# Patient Record
Sex: FEMALE | Race: WHITE
[De-identification: names, ages, dates, MRNs, and addresses within clinical notes are randomized per-mention and may not be internally consistent; named-entity substitution may affect disease eponyms.]

---

## 2019-03-11 ENCOUNTER — HOSPITAL ENCOUNTER (EMERGENCY)
Dept: HOSPITAL 47 - EC | Age: 34
Discharge: HOME | End: 2019-03-11
Payer: COMMERCIAL

## 2019-03-11 VITALS
TEMPERATURE: 98.6 F | SYSTOLIC BLOOD PRESSURE: 116 MMHG | RESPIRATION RATE: 19 BRPM | DIASTOLIC BLOOD PRESSURE: 71 MMHG | HEART RATE: 95 BPM

## 2019-03-11 DIAGNOSIS — S70.02XA: Primary | ICD-10-CM

## 2019-03-11 DIAGNOSIS — Z88.2: ICD-10-CM

## 2019-03-11 DIAGNOSIS — Z88.1: ICD-10-CM

## 2019-03-11 DIAGNOSIS — F32.9: ICD-10-CM

## 2019-03-11 DIAGNOSIS — E11.319: ICD-10-CM

## 2019-03-11 DIAGNOSIS — Z88.5: ICD-10-CM

## 2019-03-11 DIAGNOSIS — F17.200: ICD-10-CM

## 2019-03-11 DIAGNOSIS — Z79.4: ICD-10-CM
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
## 2019-03-11 DIAGNOSIS — F41.9: ICD-10-CM

## 2019-03-11 DIAGNOSIS — S20.229A: ICD-10-CM

## 2019-03-11 DIAGNOSIS — E11.40: ICD-10-CM

## 2019-03-11 PROCEDURE — 72110 X-RAY EXAM L-2 SPINE 4/>VWS: CPT

## 2019-03-11 PROCEDURE — 71045 X-RAY EXAM CHEST 1 VIEW: CPT

## 2019-03-11 PROCEDURE — 73502 X-RAY EXAM HIP UNI 2-3 VIEWS: CPT

## 2019-03-11 PROCEDURE — 99283 EMERGENCY DEPT VISIT LOW MDM: CPT

## 2019-03-11 NOTE — ED
Fall HPI





- General


Chief Complaint: Fall


Stated Complaint: Fall


Time Seen by Provider: 19 20:09


Source: patient, EMS


Mode of arrival: EMS


Limitations: no limitations





- History of Present Illness


Initial Comments: 





This is a 33-year-old female the ER for evaluation presents today for evaluation

regarding fall.  Patient a fall from extended height, greater than 10 feet.  No 

drugs or alcohol involved.  Patient is just complaining of hip and back pain.  

Did not land on her head, she did cut herself with her arms and then onto the 

right hip.  She also has back pain.  No loss of bowel or bladder no neurological

complaint, patient is amateur


MD Complaint: fall


-: minutes(s)


Fall From: from height (distance)


When Fall Occurred: 1 hour PTA


Fall Witnessed: yes, by family


Place Fall Occurred: home


Loss of Consciousness: none


Prolonged Down Time?: no


Symptoms Prior to Fall: none


Location: pelvis, buttocks


Location - Extremities: Right: Thigh


Severity: moderate


Severity scale (1-10): 6


Quality: aching


Context: tripped/slipped


Associated Symptoms: denies





- Related Data


                                Home Medications











 Medication  Instructions  Recorded  Confirmed


 


Insulin Glulisine [Apidra] See Protocol INTRATHECA CONTINUOUS 08/02/15 03/11/19


 


ALPRAZolam 0.5 mg PO DAILY PRN 16


 


Cephalexin [Keflex] 500 mg PO TID 19








                                  Previous Rx's











 Medication  Instructions  Recorded


 


Naproxen [Naprosyn] 500 mg PO Q12HR PRN #30 tab 19











                                    Allergies











Allergy/AdvReac Type Severity Reaction Status Date / Time


 


azithromycin [From Zithromax] Allergy  Unknown Verified 19 21:24


 


morphine Allergy  Unknown Verified 19 21:24


 


Sulfa (Sulfonamide Allergy  Unknown Verified 19 21:24





Antibiotics)     














Review of Systems


ROS Statement: 


Those systems with pertinent positive or pertinent negative responses have been 

documented in the HPI.





ROS Other: All systems not noted in ROS Statement are negative.





Past Medical History


Past Medical History: Diabetes Mellitus


Additional Past Medical History / Comment(s): neuropathy, retinopathy


History of Any Multi-Drug Resistant Organisms: None Reported


Past Surgical History:  Section


Past Psychological History: Anxiety, Depression


Smoking Status: Current every day smoker


Past Alcohol Use History: None Reported


Past Drug Use History: None Reported





General Exam


Limitations: physical limitation


General appearance: alert, in no apparent distress


Head exam: Present: atraumatic, normocephalic, normal inspection


Eye exam: Present: normal appearance, PERRL, EOMI.  Absent: scleral icterus, 

conjunctival injection, periorbital swelling


ENT exam: Present: normal exam, mucous membranes moist


Neck exam: Present: normal inspection.  Absent: tenderness, meningismus, 

lymphadenopathy


Respiratory exam: Present: normal lung sounds bilaterally.  Absent: respiratory 

distress, wheezes, rales, rhonchi, stridor


Cardiovascular Exam: Present: regular rate, normal rhythm, normal heart sounds. 

Absent: systolic murmur, diastolic murmur, rubs, gallop, clicks


GI/Abdominal exam: Present: soft, normal bowel sounds.  Absent: distended, 

tenderness, guarding, rebound, rigid


Extremities exam: Present: normal inspection, full ROM, normal capillary refill.

 Absent: tenderness, pedal edema, joint swelling, calf tenderness


Back exam: Present: normal inspection


Neurological exam: Present: alert, oriented X3, CN II-XII intact


Psychiatric exam: Present: normal affect, normal mood


Skin exam: Present: warm, dry, intact, normal color.  Absent: rash





Course


                                   Vital Signs











  19





  20:08


 


Temperature 98.6 F


 


Pulse Rate 95


 


Respiratory 19





Rate 


 


Blood Pressure 116/71


 


O2 Sat by Pulse 97





Oximetry 














- Reevaluation(s)


Reevaluation #1: 





Medical record is reviewed





Patient is able to ambulate








Medical Decision Making





- Medical Decision Making





33 female the ER for evaluation status post fall.  Patient is currently 

ambulatory.  Pain is controlled, x-rays are negative.  No bowel pain no again 

loss of consciousness.  No drugs or alcohol.  Patient can be discharged home





- Radiology Data


Radiology results: report reviewed (Chest x-ray pelvis x-ray lumbosacral spine 

as well as right hip are negative for traumatic injury), image reviewed





Disposition


Clinical Impression: 


 Fall, Back contusion, Contusion of right hip





Disposition: HOME SELF-CARE


Condition: Good


Instructions (If sedation given, give patient instructions):  Contusion in 

Adults (ED), Back Pain (ED), Hip Pain (ED)


Prescriptions: 


Naproxen [Naprosyn] 500 mg PO Q12HR PRN #30 tab


 PRN Reason: Pain


Is patient prescribed a controlled substance at d/c from ED?: No


Referrals: 


Tashia Kothari MD [Primary Care Provider] - 1-2 days

## 2019-03-11 NOTE — XR
EXAMINATION TYPE: XR chest 1V

 

DATE OF EXAM: 3/11/2019

 

COMPARISON: Chest x-ray April 2, 2016

 

HISTORY: Chest pain after fall injury.

 

TECHNIQUE: Single frontal view of the chest is obtained.

 

FINDINGS:  There is no focal air space opacity, pleural effusion, or pneumothorax seen.  The cardiac 
silhouette size is within normal limits.   The osseous structures are intact.

 

IMPRESSION:  No acute cardiopulmonary process.

## 2019-03-11 NOTE — XR
EXAMINATION TYPE: XR lumbosacral spine min 4V

 

DATE OF EXAM: 3/11/2019

 

CLINICAL HISTORY: Back pain after fall injury.

 

TECHNIQUE: Frontal, lateral, and oblique images of the lumbar spine are obtained.

 

COMPARISON: None

 

FINDINGS:  There are 5 lumbar type vertebral bodies identified.  The lumbar spine shows satisfactory 
alignment without evidence of acute fracture or dislocation. Vertebral body heights and disk space he
ights are within normal limits. One oblique image is suboptimal in technique. The other is within nor
mal limits. The overlying soft tissue appears unremarkable.

 

IMPRESSION:  No acute fracture or dislocation is seen in the lumbar spine.

## 2019-03-11 NOTE — XR
EXAMINATION TYPE: XR Hip LT and AP Pelvis

 

DATE OF EXAM: 3/11/2019

 

COMPARISON: NONE

 

HISTORY: Pelvic and left hip pain after fall injury.

 

TECHNIQUE: A single AP view of the pelvis is obtained. Two views of the left hip are obtained.  

 

FINDINGS:  There is no acute fracture/dislocation evident in the pelvis.  The hip and sacroiliac join
ts appear symmetric and unremarkable. Tubal ligation clips overlie the pelvis.

 

Two views of left hip show no acute fracture or dislocation.  No focal lytic or sclerotic lesion seen
 in the proximal left femur.  The overlying soft tissue is unremarkable.  

 

IMPRESSION:  There is no acute fracture or dislocation in the pelvis or left hip.

## 2019-09-06 ENCOUNTER — HOSPITAL ENCOUNTER (OUTPATIENT)
Dept: HOSPITAL 47 - LABWHC1 | Age: 34
Discharge: HOME | End: 2019-09-06
Attending: INTERNAL MEDICINE
Payer: COMMERCIAL

## 2019-09-06 DIAGNOSIS — E11.9: Primary | ICD-10-CM

## 2019-09-06 LAB
ANION GAP SERPL CALC-SCNC: 8 MMOL/L (ref 4–12)
BUN SERPL-SCNC: 9 MG/DL (ref 9–27)
BUN/CREAT SERPL: 10 RATIO (ref 12–20)
CALCIUM SPEC-MCNC: 10.1 MG/DL (ref 8.7–10.3)
CHLORIDE SERPL-SCNC: 101 MMOL/L (ref 96–109)
CO2 SERPL-SCNC: 26 MMOL/L (ref 21.6–31.8)
GLUCOSE SERPL-MCNC: 346 MG/DL (ref 70–110)
HBA1C MFR BLD: 7.8 % (ref 4–6)
POTASSIUM SERPL-SCNC: 5.9 MMOL/L (ref 3.5–5.5)
SODIUM SERPL-SCNC: 135 MMOL/L (ref 135–145)

## 2019-09-06 PROCEDURE — 83036 HEMOGLOBIN GLYCOSYLATED A1C: CPT

## 2019-09-06 PROCEDURE — 80048 BASIC METABOLIC PNL TOTAL CA: CPT

## 2019-09-06 PROCEDURE — 36415 COLL VENOUS BLD VENIPUNCTURE: CPT

## 2021-11-01 ENCOUNTER — HOSPITAL ENCOUNTER (OUTPATIENT)
Dept: HOSPITAL 47 - LABWHC1 | Age: 36
Discharge: HOME | End: 2021-11-01
Attending: INTERNAL MEDICINE
Payer: COMMERCIAL

## 2021-11-01 DIAGNOSIS — E11.9: Primary | ICD-10-CM

## 2021-11-01 LAB
ANION GAP SERPL CALC-SCNC: 16.3 MMOL/L (ref 4–12)
BUN SERPL-SCNC: 10.5 MG/DL (ref 9–27)
BUN/CREAT SERPL: 10.4 RATIO (ref 12–20)
CALCIUM SPEC-MCNC: 9.2 MG/DL (ref 8.7–10.3)
CHLORIDE SERPL-SCNC: 95 MMOL/L (ref 96–109)
CO2 SERPL-SCNC: 21.9 MMOL/L (ref 21.6–31.8)
GLUCOSE SERPL-MCNC: 240 MG/DL (ref 70–110)
POTASSIUM SERPL-SCNC: 3.9 MMOL/L (ref 3.5–5.5)
SODIUM SERPL-SCNC: 133 MMOL/L (ref 135–145)

## 2021-11-01 PROCEDURE — 36415 COLL VENOUS BLD VENIPUNCTURE: CPT

## 2021-11-01 PROCEDURE — 80048 BASIC METABOLIC PNL TOTAL CA: CPT

## 2021-11-01 PROCEDURE — 83036 HEMOGLOBIN GLYCOSYLATED A1C: CPT

## 2021-11-03 ENCOUNTER — HOSPITAL ENCOUNTER (OUTPATIENT)
Dept: HOSPITAL 47 - RADUSWWP | Age: 36
Discharge: HOME | End: 2021-11-03
Attending: INTERNAL MEDICINE
Payer: COMMERCIAL

## 2021-11-03 DIAGNOSIS — R10.9: Primary | ICD-10-CM

## 2021-11-03 PROCEDURE — 76770 US EXAM ABDO BACK WALL COMP: CPT

## 2021-11-03 NOTE — US
EXAMINATION TYPE: US kidneys/renal and bladder

 

DATE OF EXAM: 11/3/2021

 

COMPARISON: NONE

 

CLINICAL HISTORY: 36-year-old female N12 Tubulo- interstitial nephritis. Flank pain. 

 

TECHNIQUE: Multiple sonographic images of the kidneys and bladder are obtained.

 

FINDINGS:

 

EXAM MEASUREMENTS:

 

Right Kidney:  12.0 x 5.6 x 5.3 cm

Left Kidney: 10.9 x 4.5 x 5.8 cm

 

 

No hydronephrosis on either side.

 

Bladder: distended, anechoic

**Bilateral Jets seen

 

 

 

 

 

IMPRESSION:

No hydronephrosis. No discrete sonographic abnormality of the kidneys.

## 2022-11-25 ENCOUNTER — HOSPITAL ENCOUNTER (OUTPATIENT)
Dept: HOSPITAL 47 - LABWHC1 | Age: 37
Discharge: HOME | End: 2022-11-25
Attending: INTERNAL MEDICINE
Payer: COMMERCIAL

## 2022-11-25 DIAGNOSIS — E11.9: Primary | ICD-10-CM

## 2022-11-25 LAB
ANION GAP SERPL CALC-SCNC: 10.9 MMOL/L (ref 10–18)
BUN SERPL-SCNC: 9.1 MG/DL (ref 9–27)
BUN/CREAT SERPL: 10.11 RATIO (ref 12–20)
CALCIUM SPEC-MCNC: 10 MG/DL (ref 8.7–10.3)
CHLORIDE SERPL-SCNC: 104 MMOL/L (ref 96–109)
CO2 SERPL-SCNC: 27.1 MMOL/L (ref 20–27.5)
GLUCOSE SERPL-MCNC: 71 MG/DL (ref 70–110)
POTASSIUM SERPL-SCNC: 5.4 MMOL/L (ref 3.5–5.5)
SODIUM SERPL-SCNC: 142 MMOL/L (ref 135–145)

## 2022-11-25 PROCEDURE — 82043 UR ALBUMIN QUANTITATIVE: CPT

## 2022-11-25 PROCEDURE — 80048 BASIC METABOLIC PNL TOTAL CA: CPT

## 2022-11-25 PROCEDURE — 36415 COLL VENOUS BLD VENIPUNCTURE: CPT

## 2022-11-25 PROCEDURE — 83036 HEMOGLOBIN GLYCOSYLATED A1C: CPT

## 2022-11-25 PROCEDURE — 82570 ASSAY OF URINE CREATININE: CPT

## 2023-08-21 ENCOUNTER — HOSPITAL ENCOUNTER (INPATIENT)
Dept: HOSPITAL 47 - EC | Age: 38
LOS: 3 days | Discharge: HOME | DRG: 813 | End: 2023-08-24
Attending: INTERNAL MEDICINE | Admitting: INTERNAL MEDICINE
Payer: COMMERCIAL

## 2023-08-21 VITALS — BODY MASS INDEX: 21.9 KG/M2

## 2023-08-21 DIAGNOSIS — T85.614A: Primary | ICD-10-CM

## 2023-08-21 DIAGNOSIS — E10.40: ICD-10-CM

## 2023-08-21 DIAGNOSIS — E87.5: ICD-10-CM

## 2023-08-21 DIAGNOSIS — E83.39: ICD-10-CM

## 2023-08-21 DIAGNOSIS — T38.3X6A: ICD-10-CM

## 2023-08-21 DIAGNOSIS — F17.210: ICD-10-CM

## 2023-08-21 DIAGNOSIS — Z79.899: ICD-10-CM

## 2023-08-21 DIAGNOSIS — Z88.2: ICD-10-CM

## 2023-08-21 DIAGNOSIS — D72.828: ICD-10-CM

## 2023-08-21 DIAGNOSIS — E10.319: ICD-10-CM

## 2023-08-21 DIAGNOSIS — E86.0: ICD-10-CM

## 2023-08-21 DIAGNOSIS — E78.5: ICD-10-CM

## 2023-08-21 DIAGNOSIS — R25.1: ICD-10-CM

## 2023-08-21 DIAGNOSIS — Z88.5: ICD-10-CM

## 2023-08-21 DIAGNOSIS — N17.9: ICD-10-CM

## 2023-08-21 DIAGNOSIS — Z88.1: ICD-10-CM

## 2023-08-21 DIAGNOSIS — Z79.4: ICD-10-CM

## 2023-08-21 DIAGNOSIS — E10.10: ICD-10-CM

## 2023-08-21 DIAGNOSIS — I10: ICD-10-CM

## 2023-08-21 LAB
ALBUMIN SERPL-MCNC: 5.2 G/DL (ref 3.5–5)
ALP SERPL-CCNC: 97 U/L (ref 38–126)
ALT SERPL-CCNC: 44 U/L (ref 4–34)
AST SERPL-CCNC: 44 U/L (ref 14–36)
BASOPHILS # BLD AUTO: 0 K/UL (ref 0–0.2)
BASOPHILS NFR BLD AUTO: 0 %
BUN SERPL-SCNC: 32 MG/DL (ref 7–17)
CALCIUM SPEC-MCNC: 10.4 MG/DL (ref 8.4–10.2)
CHLORIDE SERPL-SCNC: 100 MMOL/L (ref 98–107)
CO2 SERPL-SCNC: <5 MMOL/L (ref 22–30)
EOSINOPHIL # BLD AUTO: 0.1 K/UL (ref 0–0.7)
EOSINOPHIL NFR BLD AUTO: 0 %
ERYTHROCYTE [DISTWIDTH] IN BLOOD BY AUTOMATED COUNT: 4.44 M/UL (ref 3.8–5.4)
ERYTHROCYTE [DISTWIDTH] IN BLOOD: 12.2 % (ref 11.5–15.5)
GLUCOSE BLD-MCNC: >600 MG/DL (ref 70–110)
GLUCOSE BLD-MCNC: >600 MG/DL (ref 70–110)
GLUCOSE SERPL-MCNC: 676 MG/DL (ref 74–99)
GLUCOSE UR QL: (no result)
HCO3 BLDV-SCNC: 5 MMOL/L (ref 24–28)
HCT VFR BLD AUTO: 46.5 % (ref 34–46)
HGB BLD-MCNC: 14.6 GM/DL (ref 11.4–16)
HYALINE CASTS UR QL AUTO: 1 /LPF (ref 0–2)
KETONES UR QL STRIP.AUTO: (no result)
LYMPHOCYTES # SPEC AUTO: 1.4 K/UL (ref 1–4.8)
LYMPHOCYTES NFR SPEC AUTO: 5 %
MAGNESIUM SPEC-SCNC: 2.7 MG/DL (ref 1.6–2.3)
MCH RBC QN AUTO: 32.8 PG (ref 25–35)
MCHC RBC AUTO-ENTMCNC: 31.4 G/DL (ref 31–37)
MCV RBC AUTO: 104.7 FL (ref 80–100)
MONOCYTES # BLD AUTO: 1.1 K/UL (ref 0–1)
MONOCYTES NFR BLD AUTO: 4 %
NEUTROPHILS # BLD AUTO: 24.5 K/UL (ref 1.3–7.7)
NEUTROPHILS NFR BLD AUTO: 90 %
PCO2 BLDV: 18 MMHG (ref 37–51)
PH BLDV: 7 [PH] (ref 7.31–7.41)
PH UR: 5 [PH] (ref 5–8)
PLATELET # BLD AUTO: 308 K/UL (ref 150–450)
POTASSIUM SERPL-SCNC: 7.4 MMOL/L (ref 3.5–5.1)
PROT SERPL-MCNC: 8.5 G/DL (ref 6.3–8.2)
PROT UR QL: (no result)
SODIUM SERPL-SCNC: 140 MMOL/L (ref 137–145)
SP GR UR: 1.02 (ref 1–1.03)
SQUAMOUS UR QL AUTO: 1 /HPF (ref 0–4)
UROBILINOGEN UR QL STRIP: <2 MG/DL (ref ?–2)
WBC # BLD AUTO: 27.2 K/UL (ref 3.8–10.6)

## 2023-08-21 PROCEDURE — 81001 URINALYSIS AUTO W/SCOPE: CPT

## 2023-08-21 PROCEDURE — 84100 ASSAY OF PHOSPHORUS: CPT

## 2023-08-21 PROCEDURE — 99291 CRITICAL CARE FIRST HOUR: CPT

## 2023-08-21 PROCEDURE — 80048 BASIC METABOLIC PNL TOTAL CA: CPT

## 2023-08-21 PROCEDURE — 82947 ASSAY GLUCOSE BLOOD QUANT: CPT

## 2023-08-21 PROCEDURE — 82565 ASSAY OF CREATININE: CPT

## 2023-08-21 PROCEDURE — 82803 BLOOD GASES ANY COMBINATION: CPT

## 2023-08-21 PROCEDURE — 82009 KETONE BODYS QUAL: CPT

## 2023-08-21 PROCEDURE — 85025 COMPLETE CBC W/AUTO DIFF WBC: CPT

## 2023-08-21 PROCEDURE — 84520 ASSAY OF UREA NITROGEN: CPT

## 2023-08-21 PROCEDURE — 83036 HEMOGLOBIN GLYCOSYLATED A1C: CPT

## 2023-08-21 PROCEDURE — 83735 ASSAY OF MAGNESIUM: CPT

## 2023-08-21 PROCEDURE — 96361 HYDRATE IV INFUSION ADD-ON: CPT

## 2023-08-21 PROCEDURE — 80051 ELECTROLYTE PANEL: CPT

## 2023-08-21 PROCEDURE — 36415 COLL VENOUS BLD VENIPUNCTURE: CPT

## 2023-08-21 PROCEDURE — 94644 CONT INHLJ TX 1ST HOUR: CPT

## 2023-08-21 PROCEDURE — 84484 ASSAY OF TROPONIN QUANT: CPT

## 2023-08-21 PROCEDURE — 96374 THER/PROPH/DIAG INJ IV PUSH: CPT

## 2023-08-21 PROCEDURE — 93005 ELECTROCARDIOGRAM TRACING: CPT

## 2023-08-21 PROCEDURE — 80053 COMPREHEN METABOLIC PANEL: CPT

## 2023-08-21 RX ADMIN — INSULIN HUMAN SCH MLS/HR: 100 INJECTION, SOLUTION PARENTERAL at 23:21

## 2023-08-21 NOTE — ED
Recheck HPI





- General


Chief Complaint: Recheck/Abnormal Lab/Rx


Stated Complaint: hyperglycemia


Time Seen by Provider: 23 20:39


Source: patient, RN notes reviewed, old records reviewed


Mode of arrival: ambulatory


Limitations: altered mental status





- History of Present Illness


Initial Comments: 





This is a 38 history of diabetes.  Patient coming in with severe nausea vomiting

weakness not feeling well.  Patient is having significant to severe distress.  

Poor strain secondary to clinical condition with severe shortness of breath 

nausea vomiting and weakness.  Patient lost her insulin pump yesterday and 

without the sun all day does admit to some alcohol consumption yesterday and 

severely altered today and severely ill


MD Complaint: abnormal lab (Elevated blood sugar)


-: hour(s)


Symptoms Since Prior Visit: worsening pain


Associated Symptoms: none


Treatments Prior to Arrival: other (0)





- Related Data


                                Home Medications











 Medication  Instructions  Recorded  Confirmed


 


Atorvastatin [Lipitor] 10 mg PO DAILY 23


 


DULoxetine HCL [Cymbalta] 60 mg PO DAILY 23


 


Glucagon [Baqsimi] 1 spray NASAL ONCE PRN 23


 


INSULIN LISPRO (For Pump) [humaLOG 0.01 units SQ-PUMP CONTINUOUS 23





(For Pump)]   








                                  Previous Rx's











 Medication  Instructions  Recorded


 


Calcium Carbonate [Tums] 500 mg PO QID PRN  tab 23


 


Ondansetron Odt [Zofran Odt] 4 mg PO Q8HR PRN #30 tab 23











                                    Allergies











Allergy/AdvReac Type Severity Reaction Status Date / Time


 


azithromycin [From Zithromax] Allergy  Unknown Verified 19 21:24


 


morphine Allergy  Unknown Verified 19 21:24


 


Sulfa (Sulfonamide Allergy  Unknown Verified 19 21:24





Antibiotics)     














Review of Systems


ROS Statement: 


Those systems with pertinent positive or pertinent negative responses have been 

documented in the HPI.





ROS Other: All systems not noted in ROS Statement are negative.





Past Medical History


Past Medical History: Diabetes Mellitus


Additional Past Medical History / Comment(s): neuropathy, retinopathy


History of Any Multi-Drug Resistant Organisms: None Reported


Past Surgical History:  Section


Past Psychological History: Anxiety, Depression


Past Alcohol Use History: None Reported


Past Drug Use History: None Reported





General Exam


Limitations: altered mental status, physical limitation


General appearance: alert, in no apparent distress, anxious, lethargic, in distr

ess


Head exam: Present: atraumatic, normocephalic, normal inspection


Eye exam: Present: normal appearance, PERRL, EOMI.  Absent: scleral icterus, 

conjunctival injection, periorbital swelling


ENT exam: Present: normal exam, mucous membranes dry


Neck exam: Present: normal inspection.  Absent: tenderness, meningismus, 

lymphadenopathy


Respiratory exam: Present: normal lung sounds bilaterally.  Absent: respiratory 

distress, wheezes, rales, rhonchi, stridor


Cardiovascular Exam: Present: normal rhythm, tachycardia, normal heart sounds.  

Absent: systolic murmur, diastolic murmur, rubs, gallop, clicks


GI/Abdominal exam: Present: soft, normal bowel sounds.  Absent: distended, 

tenderness, guarding, rebound, rigid


Extremities exam: Present: normal inspection, full ROM, normal capillary refill.

 Absent: tenderness, pedal edema, joint swelling, calf tenderness


Back exam: Present: normal inspection


Neurological exam: Present: alert, oriented X3, CN II-XII intact


Psychiatric exam: Present: normal affect, normal mood


Skin exam: Present: warm, dry, intact, normal color.  Absent: rash





Course


                                   Vital Signs











  23





  20:22 22:55


 


Temperature 97.9 F 


 


Pulse Rate 118 H 121 H


 


Respiratory 18 42 H





Rate  


 


Blood Pressure 113/55 


 


O2 Sat by Pulse 100 





Oximetry  














- Reevaluation(s)


Reevaluation #1: 





23 22:42


Medical records reviewed


Reevaluation #2: 





23 22:42


Patient found be in severe DKA, improving with hydration and Ativan shaking has 

significant


Reevaluation #3: 





23 22:43


Patient informed results and questions answered


Reevaluation #4: 





23 22:43


Was pt. sent in by a medical professional or institution (, PA, NP, urgent 

care, hospital, or nursing home...) When possible be specific


@  -no


Did you speak to anyone other than the patient for history (EMS, parent, family,

police, friend...)? What history was obtained from this source 


@  -no


Did you review nursing and triage notes (agree or disagree)?  Why? 


@  -agree


Are old charts reviewed (outside hosp., previous admission, EMS record, old EKG,

old radiological studies, urgent care reports/EKG's, nursing home records)? 

Report findings 


@  -yes


Differential Diagnosis (chest pain, altered mental status, abdominal pain women,

abdominal pain men, vaginal bleeding, weakness, fever, dyspnea, syncope, 

headache, dizziness, GI bleed, back pain, seizure, CVA, palpatations, mental 

health, musculoskeletal)? 


@  -prior


EKG interpreted by me (3pts min.).


@  -yes


X-rays interpreted by me (1pt min.).


@  -no


CT interpreted by me (1pt min.).


@  -no


U/S interpreted by me (1pt. min.).


@  -no


What testing was considered but not performed or refused? (CT, X-rays, U/S, 

labs)? Why?


@  -none


What meds were considered but not given or refused? Why?


@  -none


Did you discuss the management of the patient with other professionals 

(professionals i.e. , PA, NP, lab, RT, psych nurse, , , 

teacher, , )? Give summary


@  -no


Was smoking cessation discussed for >3mins.?


@  -no


Was critical care preformed (if so, how long)?


@  -yes65


Were there social determinants of health that impacted care today? How? 

(Homelessness, low income, unemployed, alcoholism, drug addiction, 

transportation, low edu. Level, literacy, decrease access to med. care, prison, 

rehab)?


@  -none


Was there de-escalation of care discussed even if they declined (Discuss DNR or 

withdrawal of care, Hospice)? DNR status


@  -no


What co-morbidities impacted this encounter? (DM, HTN, Smoking, COPD, CAD, 

Cancer, CVA, ARF, Chemo, Hep., AIDS, mental health diagnosis, sleep apnea, 

morbid obesity)?


@  -none


Was patient admitted / discharged? Hospital course, mention meds given and 

route, prescriptions, significant lab abnormalities, going to OR and other 

pertinent info.


@  - 38 female to the emergency department presenting in severe distress, nausea

vomiting weakness shaking tremors.  Severe shortness of breath difficulty 

breathing.  Patient is found to be in significant DKA pH 7.0, patient will be 

admitted for DKA protocol to the ICU


Admitted


Undiagnosed new problem with uncertain prognosis?


@  -no


Drug Therapy requiring intensive monitoring for toxicity (Heparin, Nitro, 

Insulin, Cardizem)?


@  -no


Were any procedures done?


@  -no


Diagnosis/symptom?


@  -Diabetic ketoacidosis


Acute, or Chronic, or Acute on Chronic?


@  -Acute


Uncomplicated (without systemic symptoms) or Complicated (systemic symptoms)?


@  -Complicated


Side effects of treatment?


@  -no


Exacerbation, Progression, or Severe Exacerbation?


@  -exacerbation


Poses a threat to life or bodily function? How? (Chest pain, USA, MI, pneumonia,

PE, COPD, DKA, ARF, appy, cholecystitis, CVA, Diverticulitis, Homicidal, 

Suicidal, threat to staff... and all critical care pts)


@  -yes severe illness with DKA





Reevaluation #5: 





23 22:43


Differential Altered Mental Status:


Hypoglycemia, DKA, hypercapnia, ETOH, overdose, CO poisoning, trauma, myxedema 

coma, HTN encephalopathy, infection, encephalitis, psychosis, intercranial 

hemorrhage, hepatic encephalopathy, meningitis, CVA, this is not meant to be an 

all-inclusive list





- Consultations


Consultation #1: 





Spoke with sound physicians who agree to admit this patient


Consultation #2: 





Spoke with ICU who accepts admission to the ICU this patient





Medical Decision Making





- Medical Decision Making





38 female to the emergency department presenting in severe distress, nausea 

vomiting weakness shaking tremors.  Severe shortness of breath difficulty 

breathing.  Patient is found to be in significant DKA pH 7.0, patient will be 

admitted for DKA protocol to the ICU





- Lab Data


Result diagrams: 


                                 23 05:25





                                 23 05:25


                                   Lab Results











  23 Range/Units





  20:55 20:55 20:55 


 


WBC  27.2 H    (3.8-10.6)  k/uL


 


RBC  4.44    (3.80-5.40)  m/uL


 


Hgb  14.6    (11.4-16.0)  gm/dL


 


Hct  46.5 H    (34.0-46.0)  %


 


MCV  104.7 H    (80.0-100.0)  fL


 


MCH  32.8    (25.0-35.0)  pg


 


MCHC  31.4    (31.0-37.0)  g/dL


 


RDW  12.2    (11.5-15.5)  %


 


Plt Count  308    (150-450)  k/uL


 


MPV  9.5    


 


Neutrophils %  90    %


 


Lymphocytes %  5    %


 


Monocytes %  4    %


 


Eosinophils %  0    %


 


Basophils %  0    %


 


Neutrophils #  24.5 H    (1.3-7.7)  k/uL


 


Lymphocytes #  1.4    (1.0-4.8)  k/uL


 


Monocytes #  1.1 H    (0-1.0)  k/uL


 


Eosinophils #  0.1    (0-0.7)  k/uL


 


Basophils #  0.0    (0-0.2)  k/uL


 


Hypochromasia  Marked    


 


Macrocytosis  Slight    


 


VBG pH     (7.31-7.41)  


 


VBG pCO2     (37-51)  mmHg


 


VBG HCO3     (24-28)  mmol/L


 


Sodium    140  (137-145)  mmol/L


 


Potassium    7.4 H*  (3.5-5.1)  mmol/L


 


Chloride    100  ()  mmol/L


 


Carbon Dioxide    <5 L*  (22-30)  mmol/L


 


Anion Gap      mmol/L


 


BUN    32 H  (7-17)  mg/dL


 


Creatinine    1.49 H  (0.52-1.04)  mg/dL


 


Est GFR (CKD-EPI)AfAm    51  (>60 ml/min/1.73 sqM)  


 


Est GFR (CKD-EPI)NonAf    44  (>60 ml/min/1.73 sqM)  


 


Glucose    676 H*  (74-99)  mg/dL


 


POC Glucose (mg/dL)     ()  mg/dL


 


POC Glu Operater ID     


 


Calcium    10.4 H  (8.4-10.2)  mg/dL


 


Phosphorus    9.9 H*  (2.5-4.5)  mg/dL


 


Magnesium    2.7 H  (1.6-2.3)  mg/dL


 


Total Bilirubin    0.8  (0.2-1.3)  mg/dL


 


AST    44 H  (14-36)  U/L


 


ALT    44 H  (4-34)  U/L


 


Alkaline Phosphatase    97  ()  U/L


 


Troponin I     (0.000-0.034)  ng/mL


 


Total Protein    8.5 H  (6.3-8.2)  g/dL


 


Albumin    5.2 H  (3.5-5.0)  g/dL


 


Urine Color   Light Yellow   


 


Urine Appearance   Clear   (Clear)  


 


Urine pH   5.0   (5.0-8.0)  


 


Ur Specific Gravity   1.018   (1.001-1.035)  


 


Urine Protein   1+ H   (Negative)  


 


Urine Glucose (UA)   4+ H   (Negative)  


 


Urine Ketones   4+ H   (Negative)  


 


Urine Blood   Small H   (Negative)  


 


Urine Nitrite   Negative   (Negative)  


 


Urine Bilirubin   Negative   (Negative)  


 


Urine Urobilinogen   <2.0   (<2.0)  mg/dL


 


Ur Leukocyte Esterase   Negative   (Negative)  


 


Ur Squamous Epith Cells   1   (0-4)  /hpf


 


Hyaline Casts   1   (0-2)  /lpf


 


Urine Mucus   Rare H   (None)  /hpf


 


Acetone, Qual    Positive  (Negative)  














  23 Range/Units





  20:55 21:25 21:33 


 


WBC     (3.8-10.6)  k/uL


 


RBC     (3.80-5.40)  m/uL


 


Hgb     (11.4-16.0)  gm/dL


 


Hct     (34.0-46.0)  %


 


MCV     (80.0-100.0)  fL


 


MCH     (25.0-35.0)  pg


 


MCHC     (31.0-37.0)  g/dL


 


RDW     (11.5-15.5)  %


 


Plt Count     (150-450)  k/uL


 


MPV     


 


Neutrophils %     %


 


Lymphocytes %     %


 


Monocytes %     %


 


Eosinophils %     %


 


Basophils %     %


 


Neutrophils #     (1.3-7.7)  k/uL


 


Lymphocytes #     (1.0-4.8)  k/uL


 


Monocytes #     (0-1.0)  k/uL


 


Eosinophils #     (0-0.7)  k/uL


 


Basophils #     (0-0.2)  k/uL


 


Hypochromasia     


 


Macrocytosis     


 


VBG pH    7.00 L*  (7.31-7.41)  


 


VBG pCO2    18 L*  (37-51)  mmHg


 


VBG HCO3    5 L*  (24-28)  mmol/L


 


Sodium     (137-145)  mmol/L


 


Potassium     (3.5-5.1)  mmol/L


 


Chloride     ()  mmol/L


 


Carbon Dioxide     (22-30)  mmol/L


 


Anion Gap     mmol/L


 


BUN     (7-17)  mg/dL


 


Creatinine     (0.52-1.04)  mg/dL


 


Est GFR (CKD-EPI)AfAm     (>60 ml/min/1.73 sqM)  


 


Est GFR (CKD-EPI)NonAf     (>60 ml/min/1.73 sqM)  


 


Glucose     (74-99)  mg/dL


 


POC Glucose (mg/dL)   >600 H   ()  mg/dL


 


POC Glu Operater ID   Augusto Porter   


 


Calcium     (8.4-10.2)  mg/dL


 


Phosphorus     (2.5-4.5)  mg/dL


 


Magnesium     (1.6-2.3)  mg/dL


 


Total Bilirubin     (0.2-1.3)  mg/dL


 


AST     (14-36)  U/L


 


ALT     (4-34)  U/L


 


Alkaline Phosphatase     ()  U/L


 


Troponin I  <0.012    (0.000-0.034)  ng/mL


 


Total Protein     (6.3-8.2)  g/dL


 


Albumin     (3.5-5.0)  g/dL


 


Urine Color     


 


Urine Appearance     (Clear)  


 


Urine pH     (5.0-8.0)  


 


Ur Specific Gravity     (1.001-1.035)  


 


Urine Protein     (Negative)  


 


Urine Glucose (UA)     (Negative)  


 


Urine Ketones     (Negative)  


 


Urine Blood     (Negative)  


 


Urine Nitrite     (Negative)  


 


Urine Bilirubin     (Negative)  


 


Urine Urobilinogen     (<2.0)  mg/dL


 


Ur Leukocyte Esterase     (Negative)  


 


Ur Squamous Epith Cells     (0-4)  /hpf


 


Hyaline Casts     (0-2)  /lpf


 


Urine Mucus     (None)  /hpf


 


Acetone, Qual     (Negative)  














- EKG Data


-: EKG Interpreted by Me (EKG is sinus tachycardia 124  QRS 83 )





Critical Care Time


Critical Care Time: Yes


Total Critical Care Time: 65





Disposition


Clinical Impression: 


 Tachycardia, Hyperkalemia, DKA (diabetic ketoacidosis)





Disposition: ADMITTED AS IP TO [facility] [place]


Condition: Good


Is patient prescribed a controlled substance at d/c from ED?: No


Time of Disposition: 22:30

## 2023-08-22 LAB
ALBUMIN SERPL-MCNC: 3.8 G/DL (ref 3.5–5)
ALP SERPL-CCNC: 63 U/L (ref 38–126)
ALT SERPL-CCNC: 25 U/L (ref 4–34)
ANION GAP SERPL CALC-SCNC: 11 MMOL/L
ANION GAP SERPL CALC-SCNC: 19 MMOL/L
ANION GAP SERPL CALC-SCNC: 9 MMOL/L
AST SERPL-CCNC: 32 U/L (ref 14–36)
BASOPHILS # BLD AUTO: 0.1 K/UL (ref 0–0.2)
BASOPHILS NFR BLD AUTO: 0 %
BUN SERPL-SCNC: 32 MG/DL (ref 7–17)
BUN SERPL-SCNC: 34 MG/DL (ref 7–17)
CALCIUM SPEC-MCNC: 7.9 MG/DL (ref 8.4–10.2)
CHLORIDE SERPL-SCNC: 107 MMOL/L (ref 98–107)
CHLORIDE SERPL-SCNC: 111 MMOL/L (ref 98–107)
CHLORIDE SERPL-SCNC: 111 MMOL/L (ref 98–107)
CHLORIDE SERPL-SCNC: 112 MMOL/L (ref 98–107)
CO2 SERPL-SCNC: 14 MMOL/L (ref 22–30)
CO2 SERPL-SCNC: 19 MMOL/L (ref 22–30)
CO2 SERPL-SCNC: 9 MMOL/L (ref 22–30)
CO2 SERPL-SCNC: <5 MMOL/L (ref 22–30)
EOSINOPHIL # BLD AUTO: 0.3 K/UL (ref 0–0.7)
EOSINOPHIL NFR BLD AUTO: 1 %
ERYTHROCYTE [DISTWIDTH] IN BLOOD BY AUTOMATED COUNT: 3.99 M/UL (ref 3.8–5.4)
ERYTHROCYTE [DISTWIDTH] IN BLOOD: 12.6 % (ref 11.5–15.5)
GLUCOSE BLD-MCNC: 101 MG/DL (ref 70–110)
GLUCOSE BLD-MCNC: 152 MG/DL (ref 70–110)
GLUCOSE BLD-MCNC: 155 MG/DL (ref 70–110)
GLUCOSE BLD-MCNC: 185 MG/DL (ref 70–110)
GLUCOSE BLD-MCNC: 190 MG/DL (ref 70–110)
GLUCOSE BLD-MCNC: 207 MG/DL (ref 70–110)
GLUCOSE BLD-MCNC: 227 MG/DL (ref 70–110)
GLUCOSE BLD-MCNC: 249 MG/DL (ref 70–110)
GLUCOSE BLD-MCNC: 270 MG/DL (ref 70–110)
GLUCOSE BLD-MCNC: 277 MG/DL (ref 70–110)
GLUCOSE BLD-MCNC: 286 MG/DL (ref 70–110)
GLUCOSE BLD-MCNC: 290 MG/DL (ref 70–110)
GLUCOSE BLD-MCNC: 293 MG/DL (ref 70–110)
GLUCOSE BLD-MCNC: 321 MG/DL (ref 70–110)
GLUCOSE BLD-MCNC: 358 MG/DL (ref 70–110)
GLUCOSE BLD-MCNC: 364 MG/DL (ref 70–110)
GLUCOSE BLD-MCNC: 405 MG/DL (ref 70–110)
GLUCOSE BLD-MCNC: 434 MG/DL (ref 70–110)
GLUCOSE BLD-MCNC: 442 MG/DL (ref 70–110)
GLUCOSE BLD-MCNC: 542 MG/DL (ref 70–110)
GLUCOSE BLD-MCNC: 572 MG/DL (ref 70–110)
GLUCOSE BLD-MCNC: 63 MG/DL (ref 70–110)
GLUCOSE BLD-MCNC: 65 MG/DL (ref 70–110)
GLUCOSE BLD-MCNC: 67 MG/DL (ref 70–110)
GLUCOSE BLD-MCNC: 93 MG/DL (ref 70–110)
GLUCOSE SERPL-MCNC: 357 MG/DL (ref 74–99)
GLUCOSE SERPL-MCNC: 498 MG/DL (ref 74–99)
HCT VFR BLD AUTO: 39.2 % (ref 34–46)
HGB BLD-MCNC: 13.1 GM/DL (ref 11.4–16)
LYMPHOCYTES # SPEC AUTO: 1.7 K/UL (ref 1–4.8)
LYMPHOCYTES NFR SPEC AUTO: 5 %
MCH RBC QN AUTO: 32.7 PG (ref 25–35)
MCHC RBC AUTO-ENTMCNC: 33.3 G/DL (ref 31–37)
MCV RBC AUTO: 98.2 FL (ref 80–100)
MONOCYTES # BLD AUTO: 1.5 K/UL (ref 0–1)
MONOCYTES NFR BLD AUTO: 4 %
NEUTROPHILS # BLD AUTO: 30.6 K/UL (ref 1.3–7.7)
NEUTROPHILS NFR BLD AUTO: 89 %
PLATELET # BLD AUTO: 230 K/UL (ref 150–450)
POTASSIUM SERPL-SCNC: 3.3 MMOL/L (ref 3.5–5.1)
POTASSIUM SERPL-SCNC: 3.9 MMOL/L (ref 3.5–5.1)
POTASSIUM SERPL-SCNC: 4.3 MMOL/L (ref 3.5–5.1)
POTASSIUM SERPL-SCNC: 4.4 MMOL/L (ref 3.5–5.1)
PROT SERPL-MCNC: 6.3 G/DL (ref 6.3–8.2)
SODIUM SERPL-SCNC: 137 MMOL/L (ref 137–145)
SODIUM SERPL-SCNC: 139 MMOL/L (ref 137–145)
SODIUM SERPL-SCNC: 139 MMOL/L (ref 137–145)
SODIUM SERPL-SCNC: 142 MMOL/L (ref 137–145)
WBC # BLD AUTO: 34.3 K/UL (ref 3.8–10.6)

## 2023-08-22 RX ADMIN — ATORVASTATIN CALCIUM SCH: 10 TABLET, FILM COATED ORAL at 08:30

## 2023-08-22 RX ADMIN — INSULIN HUMAN SCH MLS/HR: 100 INJECTION, SOLUTION PARENTERAL at 20:32

## 2023-08-22 RX ADMIN — POTASSIUM CHLORIDE SCH MEQ: 20 TABLET, EXTENDED RELEASE ORAL at 17:50

## 2023-08-22 RX ADMIN — ONDANSETRON PRN MG: 2 INJECTION INTRAMUSCULAR; INTRAVENOUS at 15:28

## 2023-08-22 RX ADMIN — DEXTROSE MONOHYDRATE, SODIUM CHLORIDE, AND POTASSIUM CHLORIDE SCH MLS/HR: 50; 4.5; 1.49 INJECTION, SOLUTION INTRAVENOUS at 22:04

## 2023-08-22 RX ADMIN — HEPARIN SODIUM SCH: 5000 INJECTION, SOLUTION INTRAVENOUS; SUBCUTANEOUS at 08:30

## 2023-08-22 RX ADMIN — POTASSIUM CHLORIDE SCH MEQ: 20 TABLET, EXTENDED RELEASE ORAL at 18:45

## 2023-08-22 RX ADMIN — INSULIN HUMAN SCH MLS/HR: 100 INJECTION, SOLUTION PARENTERAL at 09:59

## 2023-08-22 RX ADMIN — CEFAZOLIN SCH MLS/HR: 330 INJECTION, POWDER, FOR SOLUTION INTRAMUSCULAR; INTRAVENOUS at 00:51

## 2023-08-22 RX ADMIN — HEPARIN SODIUM SCH: 5000 INJECTION, SOLUTION INTRAVENOUS; SUBCUTANEOUS at 20:05

## 2023-08-22 RX ADMIN — DEXTROSE MONOHYDRATE, SODIUM CHLORIDE, AND POTASSIUM CHLORIDE SCH MLS/HR: 50; 4.5; 1.49 INJECTION, SOLUTION INTRAVENOUS at 08:23

## 2023-08-22 RX ADMIN — CEFAZOLIN SCH MLS/HR: 330 INJECTION, POWDER, FOR SOLUTION INTRAMUSCULAR; INTRAVENOUS at 04:10

## 2023-08-22 RX ADMIN — DEXTROSE MONOHYDRATE, SODIUM CHLORIDE, AND POTASSIUM CHLORIDE SCH MLS/HR: 50; 4.5; 1.49 INJECTION, SOLUTION INTRAVENOUS at 15:32

## 2023-08-22 NOTE — P.PN
Subjective


Progress Note Date: 08/22/23





Patient has no new complaints today.  Nausea, vomiting have improved.  Denies 

pain.





Gen: awake, alert


HEENT: normocephalic, atraumatic, good hearing acuity, moist mucous membranes


Resp: good air exchange, breathing comfortably with no accessory muscle use


CVS: good distal perfusion x 4,


GI: soft, NTTP, ND


: no SPT, no CVAT, toledo catheter not present


MSK: no pitting edema, no clubbing


Neuro: non-focal, moving all extremities


Psych: cooperative, euthymic mood





Hospital course:





38-year-old female with insulin-dependent diabetes, hypertension, hyperlipidemia

presented for evaluation of nausea, vomiting.  In the emergency room, patient 

was afebrile, 113/55, heart rate 118, 100% on room air.  CBC demonstrated 

leukocytosis at 34.3.  Basic metabolic panel showed an anion gap of 19, CO2 of 

9, BUNs 32, creatinine 1.21, glucose of 358.  Potassium was initially 7.4, but 

improved to 4.3.  Phosphorus was 2.2.  Liver function tests are unremarkable.  

EKG demonstrated sinus tachycardia with peaked T waves.  Case is discussed with 

emergency room provider incision was made with the patient's intensive care unit

for DKA.








Assessment:





Diabetic ketoacidosis


Hypertension


Hyperlipidemia








Plan:





Today, patient is afebrile, 115/63, heart rate 105, 95% on room air.  See the 

hospital course above from my review of lab work as well as independent 

interpretation of EKG.


Continue checking electrolytes every 4 hours


Order potassium phosphate for replacement of phosphorus





Continue insulin drip, monitor glucose every hour for toxicity


Resume patient's home atorvastatin


Continue IV fluids: D5 half-normal with 20 mEq of KCl running at 150 mL per hour





Patient is full code











Objective





- Vital Signs


Vital signs: 


                                   Vital Signs











Temp  99.0 F   08/22/23 08:00


 


Pulse  107 H  08/22/23 08:30


 


Resp  19   08/22/23 08:30


 


BP  95/51   08/22/23 08:30


 


Pulse Ox  100   08/22/23 08:30


 


FiO2      








                                 Intake & Output











 08/21/23 08/22/23 08/22/23





 18:59 06:59 18:59


 


Intake Total  3275.901 530.999


 


Output Total  0 0


 


Balance  3275.901 530.999


 


Weight  58 kg 


 


Intake:   


 


  Intake, IV Titration  3275.901 380.999





  Amount   


 


    D5-0.45% NaCl with KCl   150





    20Meq/l 1,000 ml @ 150   





    mls/hr IV .[id number] [facility] Rx#   





    :[id number]   


 


    Insulin Regular 100 unit  75.901 30.999





    In Sodium Chloride 0.9%   





    100 ml @ 0.1 UNITS/KG/HR   





    5.956 mls/hr IV .[id number]   





    [facility] Rx#:[id number]   


 


    Sodium Chloride 0.9% 1,  1200 200





    000 ml @ 200 mls/hr IV .   





    Q5H [facility] Rx#:[id number]   


 


    Sodium Chloride 0.9% 1,  1000 





    000 ml @ 999 mls/hr IV .   





    Q1H1M [facility] Rx#:[id number]   


 


    Sodium Chloride 0.9% 1,  1000 





    000 ml @ 999 mls/hr IV .   





    Q1H1M [facility] Rx#:[id number]   


 


  Oral   0


 


  Tube Feeding   150


 


Output:   


 


  Urine  0 0














- Labs


CBC & Chem 7: 


                                 08/22/23 06:03





                                 08/22/23 06:03


Labs: 


                  Abnormal Lab Results - Last 24 Hours (Table)











  08/21/23 08/21/23 08/21/23 Range/Units





  20:55 20:55 20:55 


 


WBC  27.2 H    (3.8-10.6)  k/uL


 


Hct  46.5 H    (34.0-46.0)  %


 


MCV  104.7 H    (80.0-100.0)  fL


 


Neutrophils #  24.5 H    (1.3-7.7)  k/uL


 


Monocytes #  1.1 H    (0-1.0)  k/uL


 


VBG pH     (7.31-7.41)  


 


VBG pCO2     (37-51)  mmHg


 


VBG HCO3     (24-28)  mmol/L


 


Potassium    7.4 H*  (3.5-5.1)  mmol/L


 


Chloride     ()  mmol/L


 


Carbon Dioxide    <5 L*  (22-30)  mmol/L


 


BUN    32 H  (7-17)  mg/dL


 


Creatinine    1.49 H  (0.52-1.04)  mg/dL


 


Glucose    676 H*  (74-99)  mg/dL


 


POC Glucose (mg/dL)     ()  mg/dL


 


Calcium    10.4 H  (8.4-10.2)  mg/dL


 


Phosphorus    9.9 H*  (2.5-4.5)  mg/dL


 


Magnesium    2.7 H  (1.6-2.3)  mg/dL


 


AST    44 H  (14-36)  U/L


 


ALT    44 H  (4-34)  U/L


 


Total Protein    8.5 H  (6.3-8.2)  g/dL


 


Albumin    5.2 H  (3.5-5.0)  g/dL


 


Urine Protein   1+ H   (Negative)  


 


Urine Glucose (UA)   4+ H   (Negative)  


 


Urine Ketones   4+ H   (Negative)  


 


Urine Blood   Small H   (Negative)  


 


Urine Mucus   Rare H   (None)  /hpf














  08/21/23 08/21/23 08/21/23 Range/Units





  21:25 21:33 22:53 


 


WBC     (3.8-10.6)  k/uL


 


Hct     (34.0-46.0)  %


 


MCV     (80.0-100.0)  fL


 


Neutrophils #     (1.3-7.7)  k/uL


 


Monocytes #     (0-1.0)  k/uL


 


VBG pH   7.00 L*   (7.31-7.41)  


 


VBG pCO2   18 L*   (37-51)  mmHg


 


VBG HCO3   5 L*   (24-28)  mmol/L


 


Potassium     (3.5-5.1)  mmol/L


 


Chloride     ()  mmol/L


 


Carbon Dioxide     (22-30)  mmol/L


 


BUN     (7-17)  mg/dL


 


Creatinine     (0.52-1.04)  mg/dL


 


Glucose     (74-99)  mg/dL


 


POC Glucose (mg/dL)  >600 H   >600 H  ()  mg/dL


 


Calcium     (8.4-10.2)  mg/dL


 


Phosphorus     (2.5-4.5)  mg/dL


 


Magnesium     (1.6-2.3)  mg/dL


 


AST     (14-36)  U/L


 


ALT     (4-34)  U/L


 


Total Protein     (6.3-8.2)  g/dL


 


Albumin     (3.5-5.0)  g/dL


 


Urine Protein     (Negative)  


 


Urine Glucose (UA)     (Negative)  


 


Urine Ketones     (Negative)  


 


Urine Blood     (Negative)  


 


Urine Mucus     (None)  /hpf














  08/22/23 08/22/23 08/22/23 Range/Units





  00:10 01:08 01:55 


 


WBC     (3.8-10.6)  k/uL


 


Hct     (34.0-46.0)  %


 


MCV     (80.0-100.0)  fL


 


Neutrophils #     (1.3-7.7)  k/uL


 


Monocytes #     (0-1.0)  k/uL


 


VBG pH     (7.31-7.41)  


 


VBG pCO2     (37-51)  mmHg


 


VBG HCO3     (24-28)  mmol/L


 


Potassium     (3.5-5.1)  mmol/L


 


Chloride     ()  mmol/L


 


Carbon Dioxide    <5 L*  (22-30)  mmol/L


 


BUN    34 H  (7-17)  mg/dL


 


Creatinine    1.39 H  (0.52-1.04)  mg/dL


 


Glucose    498 H  (74-99)  mg/dL


 


POC Glucose (mg/dL)  572 H  542 H   ()  mg/dL


 


Calcium     (8.4-10.2)  mg/dL


 


Phosphorus    5.4 H  (2.5-4.5)  mg/dL


 


Magnesium     (1.6-2.3)  mg/dL


 


AST     (14-36)  U/L


 


ALT     (4-34)  U/L


 


Total Protein     (6.3-8.2)  g/dL


 


Albumin     (3.5-5.0)  g/dL


 


Urine Protein     (Negative)  


 


Urine Glucose (UA)     (Negative)  


 


Urine Ketones     (Negative)  


 


Urine Blood     (Negative)  


 


Urine Mucus     (None)  /hpf














  08/22/23 08/22/23 08/22/23 Range/Units





  02:08 03:06 04:07 


 


WBC     (3.8-10.6)  k/uL


 


Hct     (34.0-46.0)  %


 


MCV     (80.0-100.0)  fL


 


Neutrophils #     (1.3-7.7)  k/uL


 


Monocytes #     (0-1.0)  k/uL


 


VBG pH     (7.31-7.41)  


 


VBG pCO2     (37-51)  mmHg


 


VBG HCO3     (24-28)  mmol/L


 


Potassium     (3.5-5.1)  mmol/L


 


Chloride     ()  mmol/L


 


Carbon Dioxide     (22-30)  mmol/L


 


BUN     (7-17)  mg/dL


 


Creatinine     (0.52-1.04)  mg/dL


 


Glucose     (74-99)  mg/dL


 


POC Glucose (mg/dL)  442 H  434 H  405 H  ()  mg/dL


 


Calcium     (8.4-10.2)  mg/dL


 


Phosphorus     (2.5-4.5)  mg/dL


 


Magnesium     (1.6-2.3)  mg/dL


 


AST     (14-36)  U/L


 


ALT     (4-34)  U/L


 


Total Protein     (6.3-8.2)  g/dL


 


Albumin     (3.5-5.0)  g/dL


 


Urine Protein     (Negative)  


 


Urine Glucose (UA)     (Negative)  


 


Urine Ketones     (Negative)  


 


Urine Blood     (Negative)  


 


Urine Mucus     (None)  /hpf














  08/22/23 08/22/23 08/22/23 Range/Units





  05:07 06:03 06:03 


 


WBC    34.3 H  (3.8-10.6)  k/uL


 


Hct     (34.0-46.0)  %


 


MCV     (80.0-100.0)  fL


 


Neutrophils #    30.6 H  (1.3-7.7)  k/uL


 


Monocytes #    1.5 H  (0-1.0)  k/uL


 


VBG pH     (7.31-7.41)  


 


VBG pCO2     (37-51)  mmHg


 


VBG HCO3     (24-28)  mmol/L


 


Potassium     (3.5-5.1)  mmol/L


 


Chloride   111 H   ()  mmol/L


 


Carbon Dioxide   9 L*   (22-30)  mmol/L


 


BUN   32 H   (7-17)  mg/dL


 


Creatinine   1.21 H   (0.52-1.04)  mg/dL


 


Glucose   357 H   (74-99)  mg/dL


 


POC Glucose (mg/dL)  364 H    ()  mg/dL


 


Calcium   7.9 L   (8.4-10.2)  mg/dL


 


Phosphorus   2.2 L   (2.5-4.5)  mg/dL


 


Magnesium     (1.6-2.3)  mg/dL


 


AST     (14-36)  U/L


 


ALT     (4-34)  U/L


 


Total Protein     (6.3-8.2)  g/dL


 


Albumin     (3.5-5.0)  g/dL


 


Urine Protein     (Negative)  


 


Urine Glucose (UA)     (Negative)  


 


Urine Ketones     (Negative)  


 


Urine Blood     (Negative)  


 


Urine Mucus     (None)  /hpf














  08/22/23 08/22/23 08/22/23 Range/Units





  06:08 06:51 08:06 


 


WBC     (3.8-10.6)  k/uL


 


Hct     (34.0-46.0)  %


 


MCV     (80.0-100.0)  fL


 


Neutrophils #     (1.3-7.7)  k/uL


 


Monocytes #     (0-1.0)  k/uL


 


VBG pH     (7.31-7.41)  


 


VBG pCO2     (37-51)  mmHg


 


VBG HCO3     (24-28)  mmol/L


 


Potassium     (3.5-5.1)  mmol/L


 


Chloride     ()  mmol/L


 


Carbon Dioxide     (22-30)  mmol/L


 


BUN     (7-17)  mg/dL


 


Creatinine     (0.52-1.04)  mg/dL


 


Glucose     (74-99)  mg/dL


 


POC Glucose (mg/dL)  358 H  321 H  277 H  ()  mg/dL


 


Calcium     (8.4-10.2)  mg/dL


 


Phosphorus     (2.5-4.5)  mg/dL


 


Magnesium     (1.6-2.3)  mg/dL


 


AST     (14-36)  U/L


 


ALT     (4-34)  U/L


 


Total Protein     (6.3-8.2)  g/dL


 


Albumin     (3.5-5.0)  g/dL


 


Urine Protein     (Negative)  


 


Urine Glucose (UA)     (Negative)  


 


Urine Ketones     (Negative)  


 


Urine Blood     (Negative)  


 


Urine Mucus     (None)  /hpf














  08/22/23 08/22/23 Range/Units





  08:57 09:59 


 


WBC    (3.8-10.6)  k/uL


 


Hct    (34.0-46.0)  %


 


MCV    (80.0-100.0)  fL


 


Neutrophils #    (1.3-7.7)  k/uL


 


Monocytes #    (0-1.0)  k/uL


 


VBG pH    (7.31-7.41)  


 


VBG pCO2    (37-51)  mmHg


 


VBG HCO3    (24-28)  mmol/L


 


Potassium    (3.5-5.1)  mmol/L


 


Chloride    ()  mmol/L


 


Carbon Dioxide    (22-30)  mmol/L


 


BUN    (7-17)  mg/dL


 


Creatinine    (0.52-1.04)  mg/dL


 


Glucose    (74-99)  mg/dL


 


POC Glucose (mg/dL)  293 H  286 H  ()  mg/dL


 


Calcium    (8.4-10.2)  mg/dL


 


Phosphorus    (2.5-4.5)  mg/dL


 


Magnesium    (1.6-2.3)  mg/dL


 


AST    (14-36)  U/L


 


ALT    (4-34)  U/L


 


Total Protein    (6.3-8.2)  g/dL


 


Albumin    (3.5-5.0)  g/dL


 


Urine Protein    (Negative)  


 


Urine Glucose (UA)    (Negative)  


 


Urine Ketones    (Negative)  


 


Urine Blood    (Negative)  


 


Urine Mucus    (None)  /hpf

## 2023-08-22 NOTE — P.CNPUL
History of Present Illness


Consult date: 23


Requesting physician: Marc Judd


Reason for consult: other (DKA; ICU management)


Chief complaint: Nausea and vomiting, hyperglycemia


History of present illness: 





I am seeing this patient in new consultation today 2023 in the intensive 

care unit for diabetic ketoacidosis.  Patient is a 38-year-old female with past 

medical history significant for type 1 diabetes mellitus, hypertension, 

hyperlipidemia, and current every day smoker.  Patient was [place] the 

river, and drinking alcohol on . Her insulin pump broke or was lost. She 

has not had any insulin since .  She presented to the emergency room last 

night with complaints of nausea and vomiting and hyperglycemia. Blood glucose on

arrival was 676, serum bicarb less than 5, anion gap unmeasurable, and she was 

ketone positive.  VBG has a pH of 7, pCO2 of 18. Patient is fully awake and 

oriented, on room air, in no acute distress. Denies any infectious symptoms. She

is refusing some aspects of care.  Blood pressure is normotensive.  Heart rhythm

is sinus tachycardia. There are kussmaul respirations.  Patient was started on 

the DKA protocol.  Insulin is currently infusing per protocol.  Normal saline is

also infusing at 200 ML's per hour.  She is also hyperkalemic with potassium of 

7.4, and there are hyperacute T waves on bedside monitor.  BMP has a sodium 140,

potassium 7.4, chloride 100, serum bicarb less than 5, BUN 32, creatinine 1.49, 

glucose 676.  There is a component of acute kidney injury, probably related to 

dehydration.  CBC on arrival showed some leukocytosis with WBC count of 27, hem

oglobin 14.6, hematocrit 46.5, platelets 308.  Afebrile.  Urinalysis not 

concerning for UTI.  Patient will be monitored in the intensive care unit until 

her DKA resolved.





Review of Systems





REVIEW OF SYSTEMS:


CONSTITUTIONAL: Denies any recent significant weight loss or weight gain.  

Denies fever.


EYES: Denies change in vision.


EARS, NOSE, MOUTH, THROAT: Denies headaches, denies sore throat.


CARDIOVASCULAR: Denies chest pain, palpitations or syncopal episodes.


RESPIRATORY: Denies shortness of breath, cough, congestion or hemoptysis. 


GASTROINTESTINAL: Denies change in appetite, abdominal pain, or diarrhea. 

Initially had some nausea and vomiting but this has subsided


GENITOURINARY: Denies hematuria, denies infections.


MUSKULOSKELETAL: Denies pain, denies swelling.


INTEGUMENTARY: Denies rash, denies eczema.


NEUROLOGICAL: Denies recent memory loss, no recent seizure activity. 


PSYCHIATRIC: Denies anxiety, denies depression.


HEMATOLOGIC/LYMPHATIC: Denies anemia, denies enlarged lymph node





Past Medical History


Past Medical History: Diabetes Mellitus


Additional Past Medical History / Comment(s): neuropathy, retinopathy


History of Any Multi-Drug Resistant Organisms: None Reported


Past Surgical History:  Section


Smoking Status: Current every day smoker





Medications and Allergies


                                Home Medications











 Medication  Instructions  Recorded  Confirmed  Type


 


Atorvastatin [Lipitor] 10 mg PO DAILY 23 History


 


DULoxetine HCL [Cymbalta] 60 mg PO DAILY 23 History


 


Enalapril Maleate [Vasotec] 2.5 mg PO DAILY 23 History


 


Glucagon [Baqsimi] 1 spray NASAL ONCE PRN 23 History


 


INSULIN LISPRO (For Pump) [humaLOG 0.01 units SQ-PUMP CONTINUOUS 23 History





(For Pump)]    








                                    Allergies











Allergy/AdvReac Type Severity Reaction Status Date / Time


 


azithromycin [From Zithromax] Allergy  Unknown Verified 19 21:24


 


morphine Allergy  Unknown Verified 19 21:24


 


Sulfa (Sulfonamide Allergy  Unknown Verified 19 21:24





Antibiotics)     














Physical Exam


Vitals: 


                                   Vital Signs











  Temp Pulse Resp BP Pulse Ox


 


 23 01:00   128 H  25 H  103/59  100


 


 23 00:30     116/65  100


 


 23 00:00   134 H  31 H  109/54  99


 


 23 23:44   128 H   


 


 23 23:36   122 H   


 


 23 23:34  98.3 F    


 


 23 23:30   125 H  27 H  119/58  98


 


 23 23:00   123 H  34 H  122/68  99


 


 23 22:55   121 H  42 H  


 


 23 20:22  97.9 F  118 H  18  113/55  100








                                Intake and Output











 23





 14:59 22:59 06:59


 


Intake Total   2217.271


 


Output Total   0


 


Balance   2217.271


 


Intake:   


 


  Intake, IV Titration   2217.271





  Amount   


 


    Insulin Regular 100 unit   17.271





    In Sodium Chloride 0.9%   





    100 ml @ 0.1 UNITS/KG/HR   





    5.956 mls/hr IV .[id number]   





    [facility] Rx#:[id number]   


 


    Sodium Chloride 0.9% 1,   200





    000 ml @ 200 mls/hr IV .   





    Q5H [facility] Rx#:[id number]   


 


    Sodium Chloride 0.9% 1,   1000





    000 ml @ 999 mls/hr IV .   





    Q1H1M [facility] Rx#:[id number]   


 


    Sodium Chloride 0.9% 1,   1000





    000 ml @ 999 mls/hr IV .   





    Q1H1M [facility] Rx#:[id number]   


 


Output:   


 


  Urine   0


 


Other:   


 


  Weight  58.967 kg 58.967 kg














GENERAL EXAM: Alert, 38-year-old white female, fairly comfortable in no apparent

distress.


HEAD: Normocephalic and atraumatic


EYES: Normal reaction of pupils, equal size.


NOSE: Clear with pink turbinates.


THROAT: No erythema or exudates.


NECK: No masses, no JVD.


CHEST: No chest wall deformity.


LUNGS: Equal air entry with no crackles, wheeze, rhonchi or dullness.  On room 

air.  There are kussmaul respirations.


CVS: S1 and S2 normal with no audible murmur, regular rhythm. No extra heart 

sounds.  Heart rate is tachycardic around 120 bpm


ABDOMEN: No hepatosplenomegaly, active bowel sounds, no guarding or rigidity. 


SPINE: No scoliosis or deformity


SKIN: No rashes


CENTRAL NERVOUS SYSTEM: No focal deficits, tone is normal in all 4 extremities.


EXTREMITIES: There is no peripheral edema, clubbing, or cyanosis.  Peripheral 

pulses are intact.





Results





- Laboratory Findings


CBC and BMP: 


                                 23 06:03





                                 23 06:03


Abnormal lab findings: 


                                  Abnormal Labs











  23





  20:55 20:55 20:55


 


WBC  27.2 H  


 


Hct  46.5 H  


 


MCV  104.7 H  


 


Neutrophils #  24.5 H  


 


Monocytes #  1.1 H  


 


VBG pH   


 


VBG pCO2   


 


VBG HCO3   


 


Potassium    7.4 H*


 


Carbon Dioxide    <5 L*


 


BUN    32 H


 


Creatinine    1.49 H


 


Glucose    676 H*


 


POC Glucose (mg/dL)   


 


Calcium    10.4 H


 


Phosphorus    9.9 H*


 


Magnesium    2.7 H


 


AST    44 H


 


ALT    44 H


 


Total Protein    8.5 H


 


Albumin    5.2 H


 


Urine Protein   1+ H 


 


Urine Glucose (UA)   4+ H 


 


Urine Ketones   4+ H 


 


Urine Blood   Small H 


 


Urine Mucus   Rare H 














  23





  21:25 21:33 22:53


 


WBC   


 


Hct   


 


MCV   


 


Neutrophils #   


 


Monocytes #   


 


VBG pH   7.00 L* 


 


VBG pCO2   18 L* 


 


VBG HCO3   5 L* 


 


Potassium   


 


Carbon Dioxide   


 


BUN   


 


Creatinine   


 


Glucose   


 


POC Glucose (mg/dL)  >600 H   >600 H


 


Calcium   


 


Phosphorus   


 


Magnesium   


 


AST   


 


ALT   


 


Total Protein   


 


Albumin   


 


Urine Protein   


 


Urine Glucose (UA)   


 


Urine Ketones   


 


Urine Blood   


 


Urine Mucus   














  23





  00:10 01:08


 


WBC  


 


Hct  


 


MCV  


 


Neutrophils #  


 


Monocytes #  


 


VBG pH  


 


VBG pCO2  


 


VBG HCO3  


 


Potassium  


 


Carbon Dioxide  


 


BUN  


 


Creatinine  


 


Glucose  


 


POC Glucose (mg/dL)  572 H  542 H


 


Calcium  


 


Phosphorus  


 


Magnesium  


 


AST  


 


ALT  


 


Total Protein  


 


Albumin  


 


Urine Protein  


 


Urine Glucose (UA)  


 


Urine Ketones  


 


Urine Blood  


 


Urine Mucus  














Assessment and Plan


Assessment: 





Acute diabetic ketoacidosis, secondary to loss of insulin pump.  Blood glucose 

on arrival was 676, serum bicarb less than 5, anion gap unmeasurable, and she 

was ketone positive.  Currently on the DKA protocol





Severe anion gap metabolic acidosis, secondary to above





Severe hyperkalemia, with ECG changes on bedside monitor.  





Dehydration





Leukocytosis, doubt infectious etiology





Acute kidney injury, likely prerenal secondary to dehydration





Hyperphosphatemia





Essential hypertension





Hyperlipidemia





Chronic nicotine dependence



































Plan:


Patient's medications and labs reviewed


Continue DKA protocol


Treat hyperkalemia with a combination of 1 amp sodium bicarbonate, albuterol 

inhalation, regular insulin 10 units IV once now, and [facility]


Recheck potassium and continue to check electrolytes every 4 hours


Normal saline is infusing at 200 ML's per hour per protocol


Patient is refusing some aspects of care, patient's family is on their way in to

reassure her


We will continue to monitor the patient will in the intensive care unit








I have personally seen and examined the patient, performed the documentation and

the assessment and plan as written.  Number of minutes spent on the visit:20





This is a joint evaluation that was done along with a nurse practitioner.  A 

ventilation was done in more than 30 minutes.  The patient is a type I diabetic 

and the patient has been on insulin pump on outpatient basis.  She lost her 

DEXicom, and the patient she stopped her pump.  The patient is currently 

presenting with DKA and she is currently on the protocol.  She has an acute 

kidney injury which is improving.  She has a reactive leukocytosis.  She has and

I get metabolic acidosis.  Most recent serum bicarb is up to 9 from being as low

was less than 5.  Potassium levels at 4.3.  She is on insulin drip which is 

currently running at 13.9 units an hour.  Mental status is still altered.  She 

was assessed.  IV fluids and she was given a total of 3 L.  She is currently on 

D5 half-normal saline running at 1 50 mL an hour.  Will monitor electrolytes.  

We'll continue to follow.  She'll be kept in the ICU.  Blood sugar management 

and Accu-Cheks every 1 hour























Time with Patient: Greater than 30

## 2023-08-22 NOTE — P.HPIM
History of Present Illness


H&P Date: 23


Chief Complaint: Nausea vomiting





38-year-old female with insulin-dependent diabetes





She is coming in for evaluation suspecting DKA due to her broken insulin pump.  

She was complaining of nausea vomiting and abdominal discomfort with elevated 

blood sugar.  Normally she has an insulin pump however on  she was having 

a few drinks and accidentally slipped lid to scraping her leg and bumping 

current insulin pump and breaking it,  she's been without insulin since , 

she denies any fevers or chills denies any coughing or chest pain denies any 

changes in her urinary or bowel habits.





Upon evaluation in the ED she was found to be in DKA with severe acidosis for wh

ich she was admitted to the ICU for further care











review of systems


Pertinent positives as noted in HPI. All other systems were reviewed and are 

negative








on exam


Constitutional:          No acute distress, conversant, pleasant


Eyes:      Anicteric sclerae, moist conjunctiva, 


         Pupils equal round reactive to light





ENMT:      NC/AT


         Oropharynx clear, no erythema, or exudates





Neck:      Supple,  no masses, or JVD


         No carotid bruits


         No thyromegaly





Lungs:      Clear to auscultation


         Clear to percussion


         Normal respiratory effort, no accessory muscle use 





Cardiovascular:      Heart regular in rate and rhythm, 


         No murmurs, gallops, or rubs


         No peripheral edema





Abdominal:       Soft


         Nontender, no guarding, rebound or rigidity


         Abdomen moving with respiration


         Normoactive bowel sounds


         No hepatomegaly, No splenomegaly


         No palpable mass 


         No abdominal wall hernia noted 





Skin:      Superficial abrasion involving the medial aspect of her left leg no 

active bleeding no purulence





Extremities:      No digital cyanosis 


         No clubbing


         Pedal pulses intact and symmetrical


         Radial pulses intact and symmetrical 


         No calf tenderness 





Psychiatric:      Alert and oriented to person, place and time


         Appropriate affect 


      


Neuro      Muscles Strength 5/5 in all 4 extremities 


         Sensation to light touch grossly present throughout


         Cranial nerves II-XII grossly intact 


Lymphatics:       no palpable cervical or supraclavicular   lymph nodes  











Past Medical History


Past Medical History: Diabetes Mellitus


Additional Past Medical History / Comment(s): neuropathy, retinopathy


History of Any Multi-Drug Resistant Organisms: None Reported


Past Surgical History:  Section


Smoking Status: Current every day smoker





Medications and Allergies


                                Home Medications











 Medication  Instructions  Recorded  Confirmed  Type


 


Atorvastatin [Lipitor] 10 mg PO DAILY 23 History


 


DULoxetine HCL [Cymbalta] 60 mg PO DAILY 23 History


 


Enalapril Maleate [Vasotec] 2.5 mg PO DAILY 23 History


 


Glucagon [Baqsimi] 1 spray NASAL ONCE PRN 23 History


 


INSULIN LISPRO (For Pump) [humaLOG 0.01 units SQ-PUMP CONTINUOUS 23 History





(For Pump)]    








                                    Allergies











Allergy/AdvReac Type Severity Reaction Status Date / Time


 


azithromycin [From Zithromax] Allergy  Unknown Verified 19 21:24


 


morphine Allergy  Unknown Verified 19 21:24


 


Sulfa (Sulfonamide Allergy  Unknown Verified 19 21:24





Antibiotics)     














Physical Exam


Vitals: 


                                   Vital Signs











  Temp Pulse Resp BP Pulse Ox


 


 23 04:00  98.4 F  118 H  20  106/57  100


 


 23 03:30   120 H  20  101/59  99


 


 23 03:00   120 H  22  103/58  100


 


 23 02:30   123 H  23  108/57  98


 


 23 02:00   128 H  27 H  109/54  100


 


 23 01:30   125 H  21  99/60  99


 


 23 01:00   128 H  25 H  103/59  100


 


 23 00:30     116/65  100


 


 23 00:00   134 H  31 H  109/54  99


 


 23 23:44   128 H   


 


 23 23:36   122 H   


 


 23 23:34  98.3 F    


 


 23 23:30   125 H  27 H  119/58  98


 


 23 23:00   123 H  34 H  122/68  99


 


 23 22:55   121 H  42 H  


 


 23 20:22  97.9 F  118 H  18  113/55  100








                                Intake and Output











 23





 14:59 22:59 06:59


 


Intake Total   2854.376


 


Output Total   0


 


Balance   2854.376


 


Intake:   


 


  Intake, IV Titration   2854.376





  Amount   


 


    Insulin Regular 100 unit   54.376





    In Sodium Chloride 0.9%   





    100 ml @ 0.1 UNITS/KG/HR   





    5.956 mls/hr IV .[id number]   





    [facility] Rx#:[id number]   


 


    Sodium Chloride 0.9% 1,   800





    000 ml @ 200 mls/hr IV .   





    Q5H [facility] Rx#:[id number]   


 


    Sodium Chloride 0.9% 1,   1000





    000 ml @ 999 mls/hr IV .   





    Q1H1M [facility] Rx#:[id number]   


 


    Sodium Chloride 0.9% 1,   1000





    000 ml @ 999 mls/hr IV .   





    Q1H1M [facility] Rx#:[id number]   


 


Output:   


 


  Urine   0


 


Other:   


 


  Weight  58.967 kg 58 kg














Results


CBC & Chem 7: 


                                 23 20:55





                                 23 01:55


Labs: 


                  Abnormal Lab Results - Last 24 Hours (Table)











  23 Range/Units





  20:55 20:55 20:55 


 


WBC  27.2 H    (3.8-10.6)  k/uL


 


Hct  46.5 H    (34.0-46.0)  %


 


MCV  104.7 H    (80.0-100.0)  fL


 


Neutrophils #  24.5 H    (1.3-7.7)  k/uL


 


Monocytes #  1.1 H    (0-1.0)  k/uL


 


VBG pH     (7.31-7.41)  


 


VBG pCO2     (37-51)  mmHg


 


VBG HCO3     (24-28)  mmol/L


 


Potassium    7.4 H*  (3.5-5.1)  mmol/L


 


Carbon Dioxide    <5 L*  (22-30)  mmol/L


 


BUN    32 H  (7-17)  mg/dL


 


Creatinine    1.49 H  (0.52-1.04)  mg/dL


 


Glucose    676 H*  (74-99)  mg/dL


 


POC Glucose (mg/dL)     ()  mg/dL


 


Calcium    10.4 H  (8.4-10.2)  mg/dL


 


Phosphorus    9.9 H*  (2.5-4.5)  mg/dL


 


Magnesium    2.7 H  (1.6-2.3)  mg/dL


 


AST    44 H  (14-36)  U/L


 


ALT    44 H  (4-34)  U/L


 


Total Protein    8.5 H  (6.3-8.2)  g/dL


 


Albumin    5.2 H  (3.5-5.0)  g/dL


 


Urine Protein   1+ H   (Negative)  


 


Urine Glucose (UA)   4+ H   (Negative)  


 


Urine Ketones   4+ H   (Negative)  


 


Urine Blood   Small H   (Negative)  


 


Urine Mucus   Rare H   (None)  /hpf














  23 Range/Units





  21:25 21:33 22:53 


 


WBC     (3.8-10.6)  k/uL


 


Hct     (34.0-46.0)  %


 


MCV     (80.0-100.0)  fL


 


Neutrophils #     (1.3-7.7)  k/uL


 


Monocytes #     (0-1.0)  k/uL


 


VBG pH   7.00 L*   (7.31-7.41)  


 


VBG pCO2   18 L*   (37-51)  mmHg


 


VBG HCO3   5 L*   (24-28)  mmol/L


 


Potassium     (3.5-5.1)  mmol/L


 


Carbon Dioxide     (22-30)  mmol/L


 


BUN     (7-17)  mg/dL


 


Creatinine     (0.52-1.04)  mg/dL


 


Glucose     (74-99)  mg/dL


 


POC Glucose (mg/dL)  >600 H   >600 H  ()  mg/dL


 


Calcium     (8.4-10.2)  mg/dL


 


Phosphorus     (2.5-4.5)  mg/dL


 


Magnesium     (1.6-2.3)  mg/dL


 


AST     (14-36)  U/L


 


ALT     (4-34)  U/L


 


Total Protein     (6.3-8.2)  g/dL


 


Albumin     (3.5-5.0)  g/dL


 


Urine Protein     (Negative)  


 


Urine Glucose (UA)     (Negative)  


 


Urine Ketones     (Negative)  


 


Urine Blood     (Negative)  


 


Urine Mucus     (None)  /hpf














  23 Range/Units





  00:10 01:08 01:55 


 


WBC     (3.8-10.6)  k/uL


 


Hct     (34.0-46.0)  %


 


MCV     (80.0-100.0)  fL


 


Neutrophils #     (1.3-7.7)  k/uL


 


Monocytes #     (0-1.0)  k/uL


 


VBG pH     (7.31-7.41)  


 


VBG pCO2     (37-51)  mmHg


 


VBG HCO3     (24-28)  mmol/L


 


Potassium     (3.5-5.1)  mmol/L


 


Carbon Dioxide    <5 L*  (22-30)  mmol/L


 


BUN    34 H  (7-17)  mg/dL


 


Creatinine    1.39 H  (0.52-1.04)  mg/dL


 


Glucose    498 H  (74-99)  mg/dL


 


POC Glucose (mg/dL)  572 H  542 H   ()  mg/dL


 


Calcium     (8.4-10.2)  mg/dL


 


Phosphorus    5.4 H  (2.5-4.5)  mg/dL


 


Magnesium     (1.6-2.3)  mg/dL


 


AST     (14-36)  U/L


 


ALT     (4-34)  U/L


 


Total Protein     (6.3-8.2)  g/dL


 


Albumin     (3.5-5.0)  g/dL


 


Urine Protein     (Negative)  


 


Urine Glucose (UA)     (Negative)  


 


Urine Ketones     (Negative)  


 


Urine Blood     (Negative)  


 


Urine Mucus     (None)  /hpf














  23 Range/Units





  02:08 03:06 04:07 


 


WBC     (3.8-10.6)  k/uL


 


Hct     (34.0-46.0)  %


 


MCV     (80.0-100.0)  fL


 


Neutrophils #     (1.3-7.7)  k/uL


 


Monocytes #     (0-1.0)  k/uL


 


VBG pH     (7.31-7.41)  


 


VBG pCO2     (37-51)  mmHg


 


VBG HCO3     (24-28)  mmol/L


 


Potassium     (3.5-5.1)  mmol/L


 


Carbon Dioxide     (22-30)  mmol/L


 


BUN     (7-17)  mg/dL


 


Creatinine     (0.52-1.04)  mg/dL


 


Glucose     (74-99)  mg/dL


 


POC Glucose (mg/dL)  442 H  434 H  405 H  ()  mg/dL


 


Calcium     (8.4-10.2)  mg/dL


 


Phosphorus     (2.5-4.5)  mg/dL


 


Magnesium     (1.6-2.3)  mg/dL


 


AST     (14-36)  U/L


 


ALT     (4-34)  U/L


 


Total Protein     (6.3-8.2)  g/dL


 


Albumin     (3.5-5.0)  g/dL


 


Urine Protein     (Negative)  


 


Urine Glucose (UA)     (Negative)  


 


Urine Ketones     (Negative)  


 


Urine Blood     (Negative)  


 


Urine Mucus     (None)  /hpf














  23 Range/Units





  05:07 


 


WBC   (3.8-10.6)  k/uL


 


Hct   (34.0-46.0)  %


 


MCV   (80.0-100.0)  fL


 


Neutrophils #   (1.3-7.7)  k/uL


 


Monocytes #   (0-1.0)  k/uL


 


VBG pH   (7.31-7.41)  


 


VBG pCO2   (37-51)  mmHg


 


VBG HCO3   (24-28)  mmol/L


 


Potassium   (3.5-5.1)  mmol/L


 


Carbon Dioxide   (22-30)  mmol/L


 


BUN   (7-17)  mg/dL


 


Creatinine   (0.52-1.04)  mg/dL


 


Glucose   (74-99)  mg/dL


 


POC Glucose (mg/dL)  364 H  ()  mg/dL


 


Calcium   (8.4-10.2)  mg/dL


 


Phosphorus   (2.5-4.5)  mg/dL


 


Magnesium   (1.6-2.3)  mg/dL


 


AST   (14-36)  U/L


 


ALT   (4-34)  U/L


 


Total Protein   (6.3-8.2)  g/dL


 


Albumin   (3.5-5.0)  g/dL


 


Urine Protein   (Negative)  


 


Urine Glucose (UA)   (Negative)  


 


Urine Ketones   (Negative)  


 


Urine Blood   (Negative)  


 


Urine Mucus   (None)  /hpf














Assessment and Plan


Assessment: 





38-year-old female with insulin-dependent diabetes coming in with nausea 

vomiting abdominal pain and elevated blood sugar I discussed the case with the 

ED doctor and accepted the admission for DKA with anticipated length of stay 

more than 2 midnights





Diabetic ketoacidosis secondary to noncompliance with insulin


Patient had broken insulin pump


Nothing by mouth


Initiated on DKA pathway with insulin drip close monitoring of blood sugar and 

IV fluid hydration with normal saline


Transition to D5 0.45 once blood sugar less than 250


Monitor potassium closely 


Admission to the ICU


ICU consult


Leukocytosis with white count of 27 was likely reactive secondary to 

hyperglycemia


Hemoglobin 14.6 unremarkable


UA negative no evidence of UTI





Hyperkalemia secondary to severe acidosis


Initial potassium 7.4


Resolved





Acute kidney injury secondary to dehydration secondary to above


Avoid nephrotoxic meds


Hold enalapril


IV fluid hydration normal saline


Creatinine 1.49, BUN 32





Full code


DVT prophylaxis heparin subcu 3 times a day

## 2023-08-23 LAB
ANION GAP SERPL CALC-SCNC: 10 MMOL/L
BASOPHILS # BLD AUTO: 0 K/UL (ref 0–0.2)
BASOPHILS NFR BLD AUTO: 0 %
BUN SERPL-SCNC: 12 MG/DL (ref 7–17)
CALCIUM SPEC-MCNC: 7.9 MG/DL (ref 8.4–10.2)
CHLORIDE SERPL-SCNC: 109 MMOL/L (ref 98–107)
CO2 SERPL-SCNC: 16 MMOL/L (ref 22–30)
EOSINOPHIL # BLD AUTO: 0.1 K/UL (ref 0–0.7)
EOSINOPHIL NFR BLD AUTO: 0 %
ERYTHROCYTE [DISTWIDTH] IN BLOOD BY AUTOMATED COUNT: 3.61 M/UL (ref 3.8–5.4)
ERYTHROCYTE [DISTWIDTH] IN BLOOD: 12.6 % (ref 11.5–15.5)
GLUCOSE BLD-MCNC: 130 MG/DL (ref 70–110)
GLUCOSE BLD-MCNC: 130 MG/DL (ref 70–110)
GLUCOSE BLD-MCNC: 131 MG/DL (ref 70–110)
GLUCOSE BLD-MCNC: 171 MG/DL (ref 70–110)
GLUCOSE BLD-MCNC: 186 MG/DL (ref 70–110)
GLUCOSE BLD-MCNC: 200 MG/DL (ref 70–110)
GLUCOSE BLD-MCNC: 208 MG/DL (ref 70–110)
GLUCOSE BLD-MCNC: 210 MG/DL (ref 70–110)
GLUCOSE BLD-MCNC: 211 MG/DL (ref 70–110)
GLUCOSE BLD-MCNC: 225 MG/DL (ref 70–110)
GLUCOSE BLD-MCNC: 240 MG/DL (ref 70–110)
GLUCOSE BLD-MCNC: 246 MG/DL (ref 70–110)
GLUCOSE BLD-MCNC: 251 MG/DL (ref 70–110)
GLUCOSE BLD-MCNC: 252 MG/DL (ref 70–110)
GLUCOSE BLD-MCNC: 263 MG/DL (ref 70–110)
GLUCOSE BLD-MCNC: 95 MG/DL (ref 70–110)
GLUCOSE SERPL-MCNC: 208 MG/DL (ref 74–99)
HCT VFR BLD AUTO: 34.8 % (ref 34–46)
HGB BLD-MCNC: 11.7 GM/DL (ref 11.4–16)
LYMPHOCYTES # SPEC AUTO: 2.5 K/UL (ref 1–4.8)
LYMPHOCYTES NFR SPEC AUTO: 9 %
MCH RBC QN AUTO: 32.3 PG (ref 25–35)
MCHC RBC AUTO-ENTMCNC: 33.5 G/DL (ref 31–37)
MCV RBC AUTO: 96.5 FL (ref 80–100)
MONOCYTES # BLD AUTO: 1.1 K/UL (ref 0–1)
MONOCYTES NFR BLD AUTO: 4 %
NEUTROPHILS # BLD AUTO: 22.9 K/UL (ref 1.3–7.7)
NEUTROPHILS NFR BLD AUTO: 86 %
PLATELET # BLD AUTO: 188 K/UL (ref 150–450)
POTASSIUM SERPL-SCNC: 4.6 MMOL/L (ref 3.5–5.1)
SODIUM SERPL-SCNC: 135 MMOL/L (ref 137–145)
WBC # BLD AUTO: 26.8 K/UL (ref 3.8–10.6)

## 2023-08-23 RX ADMIN — DEXTROSE MONOHYDRATE, SODIUM CHLORIDE, AND POTASSIUM CHLORIDE SCH MLS/HR: 50; 4.5; 1.49 INJECTION, SOLUTION INTRAVENOUS at 16:50

## 2023-08-23 RX ADMIN — INSULIN LISPRO SCH: 100 INJECTION, SOLUTION INTRAVENOUS; SUBCUTANEOUS at 22:30

## 2023-08-23 RX ADMIN — HEPARIN SODIUM SCH: 5000 INJECTION, SOLUTION INTRAVENOUS; SUBCUTANEOUS at 07:41

## 2023-08-23 RX ADMIN — CALCIUM CARBONATE (ANTACID) CHEW TAB 500 MG PRN MG: 500 CHEW TAB at 19:12

## 2023-08-23 RX ADMIN — HEPARIN SODIUM SCH: 5000 INJECTION, SOLUTION INTRAVENOUS; SUBCUTANEOUS at 00:29

## 2023-08-23 RX ADMIN — ONDANSETRON PRN MG: 2 INJECTION INTRAMUSCULAR; INTRAVENOUS at 15:35

## 2023-08-23 RX ADMIN — DEXTROSE MONOHYDRATE, SODIUM CHLORIDE, AND POTASSIUM CHLORIDE SCH MLS/HR: 50; 4.5; 1.49 INJECTION, SOLUTION INTRAVENOUS at 19:12

## 2023-08-23 RX ADMIN — ATORVASTATIN CALCIUM SCH MG: 10 TABLET, FILM COATED ORAL at 08:57

## 2023-08-23 RX ADMIN — ONDANSETRON PRN MG: 2 INJECTION INTRAMUSCULAR; INTRAVENOUS at 02:12

## 2023-08-23 RX ADMIN — DEXTROSE MONOHYDRATE, SODIUM CHLORIDE, AND POTASSIUM CHLORIDE SCH MLS/HR: 50; 4.5; 1.49 INJECTION, SOLUTION INTRAVENOUS at 07:08

## 2023-08-23 RX ADMIN — HEPARIN SODIUM SCH: 5000 INJECTION, SOLUTION INTRAVENOUS; SUBCUTANEOUS at 16:49

## 2023-08-23 NOTE — P.PN
Subjective


Progress Note Date: 08/23/23





Patient has no new complaints today.  Nausea, vomiting have improved.  Denies 

pain. Tolerating diet





Gen: awake, alert


HEENT: normocephalic, atraumatic, good hearing acuity, moist mucous membranes


Resp: good air exchange, breathing comfortably with no accessory muscle use


CVS: good distal perfusion x 4,


GI: soft, NTTP, ND


: no SPT, no CVAT, toledo catheter not present


MSK: no pitting edema, no clubbing


Neuro: non-focal, moving all extremities


Psych: cooperative, euthymic mood





Hospital course:





38-year-old female with insulin-dependent diabetes, hypertension, hyperlipidemia

presented for evaluation of nausea, vomiting.  In the emergency room, patient 

was afebrile, 113/55, heart rate 118, 100% on room air.  CBC demonstrated 

leukocytosis at 34.3.  Basic metabolic panel showed an anion gap of 19, CO2 of 

9, BUNs 32, creatinine 1.21, glucose of 358.  Potassium was initially 7.4, but 

improved to 4.3.  Phosphorus was 2.2.  Liver function tests are unremarkable.  

EKG demonstrated sinus tachycardia with peaked T waves.  Case is discussed with 

emergency room provider incision was made with the patient's intensive care unit

for DKA.








Assessment:





Diabetic ketoacidosis


Hypertension


Hyperlipidemia








Plan:





Today, patient is afebrile, 113/78, heart rate 92, 97% on room air.  CBC today 

shows improvement of leukocytosis to 26.8.  Na is 135, Cl 109, CO2 16, AGAP 10.








Pt transitioned to levemir 24U Daily, SSI aspart


Resume patient's home atorvastatin


Continue IV fluids: D5 half-normal with 20 mEq of KCl running at 150 mL per hour


Diet advanced to carbohydrate consistent regular





Patient is full code











Objective





- Vital Signs


Vital signs: 


                                   Vital Signs











Temp  98.9 F   08/23/23 08:00


 


Pulse  92   08/23/23 10:00


 


Resp  22   08/23/23 10:00


 


BP  113/78   08/23/23 10:00


 


Pulse Ox  97   08/23/23 10:00


 


FiO2      








                                 Intake & Output











 08/22/23 08/23/23 08/23/23





 18:59 06:59 18:59


 


Intake Total 3481.999 2571.566 730.15


 


Output Total 1650 925 0


 


Balance 3845.965 8228.566 730.15


 


Weight 58 kg 62 kg 


 


Intake:   


 


  IV 1750 1800 600


 


    D5-0.45% NaCl with KCl 1500 1800 600





    20Meq/l 1,000 ml @ 150   





    mls/hr IV .Q6H40M [facility] Rx#   





    :[id number]   


 


    Sodium Phosphate 15 mmol 250  





    In Dextrose 5% in Water   





    250 ml @ 127.5 mls/hr   





    [facility] ON[facility] Rx#:   





    [id number]   


 


  Intake, IV Titration 481.999 16.566 10.15





  Amount   


 


    D5-0.45% NaCl with KCl 150  





    20Meq/l 1,000 ml @ 150   





    mls/hr IV .Q6H40M [facility] Rx#   





    :[id number]   


 


    Insulin Regular 100 unit 131.999 16.566 10.15





    In Sodium Chloride 0.9%   





    100 ml @ 0.1 UNITS/KG/HR   





    5.956 mls/hr IV .[id number]   





    [facility] Rx#:[id number]   


 


    Sodium Chloride 0.9% 1, 200  





    000 ml @ 200 mls/hr IV .   





    Q5H [facility] Rx#:[id number]   


 


  Oral 1100 755 120


 


  Tube Feeding 150  


 


Output:   


 


  Urine 1650 925 0


 


Other:   


 


  Voiding Method  Bedside Commode Bedside Commode


 


  # Voids 1 1 














- Labs


CBC & Chem 7: 


                                 08/23/23 05:25





                                 08/23/23 05:25


Labs: 


                  Abnormal Lab Results - Last 24 Hours (Table)











  08/22/23 08/22/23 08/22/23 Range/Units





  11:02 11:53 11:59 


 


WBC     (3.8-10.6)  k/uL


 


RBC     (3.80-5.40)  m/uL


 


Neutrophils #     (1.3-7.7)  k/uL


 


Monocytes #     (0-1.0)  k/uL


 


Sodium     (137-145)  mmol/L


 


Potassium     (3.5-5.1)  mmol/L


 


Chloride   112 H   ()  mmol/L


 


Carbon Dioxide   14 L   (22-30)  mmol/L


 


Glucose     (74-99)  mg/dL


 


POC Glucose (mg/dL)  290 H   270 H  ()  mg/dL


 


Calcium     (8.4-10.2)  mg/dL














  08/22/23 08/22/23 08/22/23 Range/Units





  12:59 13:57 14:59 


 


WBC     (3.8-10.6)  k/uL


 


RBC     (3.80-5.40)  m/uL


 


Neutrophils #     (1.3-7.7)  k/uL


 


Monocytes #     (0-1.0)  k/uL


 


Sodium     (137-145)  mmol/L


 


Potassium     (3.5-5.1)  mmol/L


 


Chloride     ()  mmol/L


 


Carbon Dioxide     (22-30)  mmol/L


 


Glucose     (74-99)  mg/dL


 


POC Glucose (mg/dL)  249 H  227 H  155 H  ()  mg/dL


 


Calcium     (8.4-10.2)  mg/dL














  08/22/23 08/22/23 08/22/23 Range/Units





  16:01 16:59 17:32 


 


WBC     (3.8-10.6)  k/uL


 


RBC     (3.80-5.40)  m/uL


 


Neutrophils #     (1.3-7.7)  k/uL


 


Monocytes #     (0-1.0)  k/uL


 


Sodium     (137-145)  mmol/L


 


Potassium  3.3 L    (3.5-5.1)  mmol/L


 


Chloride  111 H    ()  mmol/L


 


Carbon Dioxide  19 L    (22-30)  mmol/L


 


Glucose     (74-99)  mg/dL


 


POC Glucose (mg/dL)   65 L  63 L  ()  mg/dL


 


Calcium     (8.4-10.2)  mg/dL














  08/22/23 08/22/23 08/22/23 Range/Units





  17:55 20:27 21:06 


 


WBC     (3.8-10.6)  k/uL


 


RBC     (3.80-5.40)  m/uL


 


Neutrophils #     (1.3-7.7)  k/uL


 


Monocytes #     (0-1.0)  k/uL


 


Sodium     (137-145)  mmol/L


 


Potassium     (3.5-5.1)  mmol/L


 


Chloride     ()  mmol/L


 


Carbon Dioxide     (22-30)  mmol/L


 


Glucose     (74-99)  mg/dL


 


POC Glucose (mg/dL)  67 L  190 H  185 H  ()  mg/dL


 


Calcium     (8.4-10.2)  mg/dL














  08/22/23 08/22/23 08/23/23 Range/Units





  22:08 23:03 00:15 


 


WBC     (3.8-10.6)  k/uL


 


RBC     (3.80-5.40)  m/uL


 


Neutrophils #     (1.3-7.7)  k/uL


 


Monocytes #     (0-1.0)  k/uL


 


Sodium     (137-145)  mmol/L


 


Potassium     (3.5-5.1)  mmol/L


 


Chloride     ()  mmol/L


 


Carbon Dioxide     (22-30)  mmol/L


 


Glucose     (74-99)  mg/dL


 


POC Glucose (mg/dL)  207 H  152 H  131 H  ()  mg/dL


 


Calcium     (8.4-10.2)  mg/dL














  08/23/23 08/23/23 08/23/23 Range/Units





  01:02 02:05 02:56 


 


WBC     (3.8-10.6)  k/uL


 


RBC     (3.80-5.40)  m/uL


 


Neutrophils #     (1.3-7.7)  k/uL


 


Monocytes #     (0-1.0)  k/uL


 


Sodium     (137-145)  mmol/L


 


Potassium     (3.5-5.1)  mmol/L


 


Chloride     ()  mmol/L


 


Carbon Dioxide     (22-30)  mmol/L


 


Glucose     (74-99)  mg/dL


 


POC Glucose (mg/dL)  130 H  171 H  186 H  ()  mg/dL


 


Calcium     (8.4-10.2)  mg/dL














  08/23/23 08/23/23 08/23/23 Range/Units





  04:05 05:03 05:25 


 


WBC    26.8 H  (3.8-10.6)  k/uL


 


RBC    3.61 L  (3.80-5.40)  m/uL


 


Neutrophils #    22.9 H  (1.3-7.7)  k/uL


 


Monocytes #    1.1 H  (0-1.0)  k/uL


 


Sodium     (137-145)  mmol/L


 


Potassium     (3.5-5.1)  mmol/L


 


Chloride     ()  mmol/L


 


Carbon Dioxide     (22-30)  mmol/L


 


Glucose     (74-99)  mg/dL


 


POC Glucose (mg/dL)  208 H  210 H   ()  mg/dL


 


Calcium     (8.4-10.2)  mg/dL














  08/23/23 08/23/23 08/23/23 Range/Units





  05:25 06:13 07:05 


 


WBC     (3.8-10.6)  k/uL


 


RBC     (3.80-5.40)  m/uL


 


Neutrophils #     (1.3-7.7)  k/uL


 


Monocytes #     (0-1.0)  k/uL


 


Sodium  135 L    (137-145)  mmol/L


 


Potassium     (3.5-5.1)  mmol/L


 


Chloride  109 H    ()  mmol/L


 


Carbon Dioxide  16 L    (22-30)  mmol/L


 


Glucose  208 H    (74-99)  mg/dL


 


POC Glucose (mg/dL)   211 H  225 H  ()  mg/dL


 


Calcium  7.9 L    (8.4-10.2)  mg/dL














  08/23/23 08/23/23 08/23/23 Range/Units





  07:59 09:08 10:13 


 


WBC     (3.8-10.6)  k/uL


 


RBC     (3.80-5.40)  m/uL


 


Neutrophils #     (1.3-7.7)  k/uL


 


Monocytes #     (0-1.0)  k/uL


 


Sodium     (137-145)  mmol/L


 


Potassium     (3.5-5.1)  mmol/L


 


Chloride     ()  mmol/L


 


Carbon Dioxide     (22-30)  mmol/L


 


Glucose     (74-99)  mg/dL


 


POC Glucose (mg/dL)  246 H  252 H  263 H  ()  mg/dL


 


Calcium     (8.4-10.2)  mg/dL

## 2023-08-23 NOTE — P.PN
Subjective


Progress Note Date: 08/23/23








I am seeing this patient in new consultation today 08/22/2023 in the intensive 

care unit for diabetic ketoacidosis.  Patient is a 38-year-old female with past 

medical history significant for type 1 diabetes mellitus, hypertension, 

hyperlipidemia, and current every day smoker.  Patient was [place] the 

river, and drinking alcohol on Sunday. Her insulin pump broke or was lost. She 

has not had any insulin since Sunday.  She presented to the emergency room last 

night with complaints of nausea and vomiting and hyperglycemia. Blood glucose on

arrival was 676, serum bicarb less than 5, anion gap unmeasurable, and she was 

ketone positive.  VBG has a pH of 7, pCO2 of 18. Patient is fully awake and or

iented, on room air, in no acute distress. Denies any infectious symptoms. She 

is refusing some aspects of care.  Blood pressure is normotensive.  Heart rhythm

is sinus tachycardia. There are kussmaul respirations.  Patient was started on 

the DKA protocol.  Insulin is currently infusing per protocol.  Normal saline is

also infusing at 200 ML's per hour.  She is also hyperkalemic with potassium of 

7.4, and there are hyperacute T waves on bedside monitor.  BMP has a sodium 140,

potassium 7.4, chloride 100, serum bicarb less than 5, BUN 32, creatinine 1.49, 

glucose 676.  There is a component of acute kidney injury, probably related to 

dehydration.  CBC on arrival showed some leukocytosis with WBC count of 27, 

hemoglobin 14.6, hematocrit 46.5, platelets 308.  Afebrile.  Urinalysis not 

concerning for UTI.  Patient will be monitored in the intensive care unit until 

her DKA resolved.





08/23/2023, I'm seeing the patient for a follow-up.  She is fully alert and 

awake and she is hungry and she is requesting food.  Overnight, she was On an 

insulin drip as the patient continued to have issues with some mild anion gap 

and non-anion gap metabolic acidosis.  This morning, her serum bicarb is at 16 

with a gap of 10 and a sodium level is at 135 with a potassium level of 4.6.  

The white cell count is 26 with a hemoglobin of 11.7.  She did have some 

reactive leukocytosis which is improving.  No nausea.  No emesis.  No other 

significant events overnight.  This morning, she is on insulin at 1 unit an 

hour.  She is also on D5 half-normal saline at the rate of 150 mL an hour.





Objective





- Vital Signs


Vital signs: 


                                   Vital Signs











Temp  98.9 F   08/23/23 08:00


 


Pulse  101 H  08/23/23 08:00


 


Resp  16   08/23/23 08:00


 


BP  117/73   08/23/23 08:00


 


Pulse Ox  98   08/23/23 08:00


 


FiO2      








                                 Intake & Output











 08/22/23 08/23/23 08/23/23





 18:59 06:59 18:59


 


Intake Total 3481.999 2571.566 300


 


Output Total 1650 925 0


 


Balance 3521.361 8082.566 300


 


Weight 58 kg 62 kg 


 


Intake:   


 


  IV 1750 1800 300


 


    D5-0.45% NaCl with KCl 1500 1800 300





    20Meq/l 1,000 ml @ 150   





    mls/hr IV .Q6H40M [facility] Rx#   





    :[id number]   


 


    Sodium Phosphate 15 mmol 250  





    In Dextrose 5% in Water   





    250 ml @ 127.5 mls/hr   





    [facility]PB ONCE ONE Rx#:   





    [id number]   


 


  Intake, IV Titration 481.999 16.566 





  Amount   


 


    D5-0.45% NaCl with KCl 150  





    20Meq/l 1,000 ml @ 150   





    mls/hr IV .Q6H40M [facility] Rx#   





    :[id number]   


 


    Insulin Regular 100 unit 131.999 16.566 





    In Sodium Chloride 0.9%   





    100 ml @ 0.1 UNITS/KG/HR   





    5.956 mls/hr IV .[id number]   





    [facility] Rx#:[id number]   


 


    Sodium Chloride 0.9% 1, 200  





    000 ml @ 200 mls/hr IV .   





    Q5H [facility] Rx#:[id number]   


 


  Oral 1100 755 0


 


  Tube Feeding 150  


 


Output:   


 


  Urine 1650 925 0


 


Other:   


 


  Voiding Method  Bedside Commode Bedside Commode


 


  # Voids 1 1 














- Exam





The patient appeared well nourished and normally developed. Vital signs as 

documented. Head exam is unremarkable. No scleral icterus or corneal arcus 

noted. Neck is without jugular venous distension, thyromegaly, or carotid 

bruits. Carotid upstrokes are brisk bilaterally. Lungs are clear to auscultation

and percussion. Cardiac exam reveals the PMI to be normally sized and situated. 

Rhythm is regular. First and second heart sounds normal. No murmurs, rubs or 

gallops. Abdominal exam reveals normal bowel sounds, no masses, no organomegaly 

and no aortic enlargement. Extremities are nonedematous and both femoral and 

pedal pulses are normal.Examination of the skin revealed no evidence of 

significant rashes, suspicious appearing nevi or other concerning 

lesions.Neurologically, the patient is awake and alert and the patient does not 

have any focal neurological deficit.  Cranial nerves are essentially intact.





- Labs


CBC & Chem 7: 


                                 08/23/23 05:25





                                 08/23/23 05:25


Labs: 


                  Abnormal Lab Results - Last 24 Hours (Table)











  08/22/23 08/22/23 08/22/23 Range/Units





  08:57 09:59 11:02 


 


WBC     (3.8-10.6)  k/uL


 


RBC     (3.80-5.40)  m/uL


 


Neutrophils #     (1.3-7.7)  k/uL


 


Monocytes #     (0-1.0)  k/uL


 


Sodium     (137-145)  mmol/L


 


Potassium     (3.5-5.1)  mmol/L


 


Chloride     ()  mmol/L


 


Carbon Dioxide     (22-30)  mmol/L


 


Glucose     (74-99)  mg/dL


 


POC Glucose (mg/dL)  293 H  286 H  290 H  ()  mg/dL


 


Calcium     (8.4-10.2)  mg/dL














  08/22/23 08/22/23 08/22/23 Range/Units





  11:53 11:59 12:59 


 


WBC     (3.8-10.6)  k/uL


 


RBC     (3.80-5.40)  m/uL


 


Neutrophils #     (1.3-7.7)  k/uL


 


Monocytes #     (0-1.0)  k/uL


 


Sodium     (137-145)  mmol/L


 


Potassium     (3.5-5.1)  mmol/L


 


Chloride  112 H    ()  mmol/L


 


Carbon Dioxide  14 L    (22-30)  mmol/L


 


Glucose     (74-99)  mg/dL


 


POC Glucose (mg/dL)   270 H  249 H  ()  mg/dL


 


Calcium     (8.4-10.2)  mg/dL














  08/22/23 08/22/23 08/22/23 Range/Units





  13:57 14:59 16:01 


 


WBC     (3.8-10.6)  k/uL


 


RBC     (3.80-5.40)  m/uL


 


Neutrophils #     (1.3-7.7)  k/uL


 


Monocytes #     (0-1.0)  k/uL


 


Sodium     (137-145)  mmol/L


 


Potassium    3.3 L  (3.5-5.1)  mmol/L


 


Chloride    111 H  ()  mmol/L


 


Carbon Dioxide    19 L  (22-30)  mmol/L


 


Glucose     (74-99)  mg/dL


 


POC Glucose (mg/dL)  227 H  155 H   ()  mg/dL


 


Calcium     (8.4-10.2)  mg/dL














  08/22/23 08/22/23 08/22/23 Range/Units





  16:59 17:32 17:55 


 


WBC     (3.8-10.6)  k/uL


 


RBC     (3.80-5.40)  m/uL


 


Neutrophils #     (1.3-7.7)  k/uL


 


Monocytes #     (0-1.0)  k/uL


 


Sodium     (137-145)  mmol/L


 


Potassium     (3.5-5.1)  mmol/L


 


Chloride     ()  mmol/L


 


Carbon Dioxide     (22-30)  mmol/L


 


Glucose     (74-99)  mg/dL


 


POC Glucose (mg/dL)  65 L  63 L  67 L  ()  mg/dL


 


Calcium     (8.4-10.2)  mg/dL














  08/22/23 08/22/23 08/22/23 Range/Units





  20:27 21:06 22:08 


 


WBC     (3.8-10.6)  k/uL


 


RBC     (3.80-5.40)  m/uL


 


Neutrophils #     (1.3-7.7)  k/uL


 


Monocytes #     (0-1.0)  k/uL


 


Sodium     (137-145)  mmol/L


 


Potassium     (3.5-5.1)  mmol/L


 


Chloride     ()  mmol/L


 


Carbon Dioxide     (22-30)  mmol/L


 


Glucose     (74-99)  mg/dL


 


POC Glucose (mg/dL)  190 H  185 H  207 H  ()  mg/dL


 


Calcium     (8.4-10.2)  mg/dL














  08/22/23 08/23/23 08/23/23 Range/Units





  23:03 00:15 01:02 


 


WBC     (3.8-10.6)  k/uL


 


RBC     (3.80-5.40)  m/uL


 


Neutrophils #     (1.3-7.7)  k/uL


 


Monocytes #     (0-1.0)  k/uL


 


Sodium     (137-145)  mmol/L


 


Potassium     (3.5-5.1)  mmol/L


 


Chloride     ()  mmol/L


 


Carbon Dioxide     (22-30)  mmol/L


 


Glucose     (74-99)  mg/dL


 


POC Glucose (mg/dL)  152 H  131 H  130 H  ()  mg/dL


 


Calcium     (8.4-10.2)  mg/dL














  08/23/23 08/23/23 08/23/23 Range/Units





  02:05 02:56 04:05 


 


WBC     (3.8-10.6)  k/uL


 


RBC     (3.80-5.40)  m/uL


 


Neutrophils #     (1.3-7.7)  k/uL


 


Monocytes #     (0-1.0)  k/uL


 


Sodium     (137-145)  mmol/L


 


Potassium     (3.5-5.1)  mmol/L


 


Chloride     ()  mmol/L


 


Carbon Dioxide     (22-30)  mmol/L


 


Glucose     (74-99)  mg/dL


 


POC Glucose (mg/dL)  171 H  186 H  208 H  ()  mg/dL


 


Calcium     (8.4-10.2)  mg/dL














  08/23/23 08/23/23 08/23/23 Range/Units





  05:03 05:25 05:25 


 


WBC   26.8 H   (3.8-10.6)  k/uL


 


RBC   3.61 L   (3.80-5.40)  m/uL


 


Neutrophils #   22.9 H   (1.3-7.7)  k/uL


 


Monocytes #   1.1 H   (0-1.0)  k/uL


 


Sodium    135 L  (137-145)  mmol/L


 


Potassium     (3.5-5.1)  mmol/L


 


Chloride    109 H  ()  mmol/L


 


Carbon Dioxide    16 L  (22-30)  mmol/L


 


Glucose    208 H  (74-99)  mg/dL


 


POC Glucose (mg/dL)  210 H    ()  mg/dL


 


Calcium    7.9 L  (8.4-10.2)  mg/dL














  08/23/23 08/23/23 08/23/23 Range/Units





  06:13 07:05 07:59 


 


WBC     (3.8-10.6)  k/uL


 


RBC     (3.80-5.40)  m/uL


 


Neutrophils #     (1.3-7.7)  k/uL


 


Monocytes #     (0-1.0)  k/uL


 


Sodium     (137-145)  mmol/L


 


Potassium     (3.5-5.1)  mmol/L


 


Chloride     ()  mmol/L


 


Carbon Dioxide     (22-30)  mmol/L


 


Glucose     (74-99)  mg/dL


 


POC Glucose (mg/dL)  211 H  225 H  246 H  ()  mg/dL


 


Calcium     (8.4-10.2)  mg/dL














Assessment and Plan


Assessment: 





Acute diabetic ketoacidosis, fully alert and awake and had a gap is down to 10 

with a serum bicarb of 16 and currently she is insulin-dependent 1 units an hour





Severe anion gap metabolic acidosis, secondary to above, improved and the 

patient has a component of non-anion gap metabolic acidosis at this point in 

time





Severe hyperkalemia, with ECG changes , recovered 





Dehydration, recovered





Leukocytosis, doubt infectious etiology, reactive, improving





Acute kidney injury, likely prerenal secondary to dehydration, recovered





Hyperphosphatemia





Essential hypertension





Hyperlipidemia





Chronic nicotine dependence

















Plan:





Allow carbohydrate consistent diet


Change IV fluids to KVO


Give the patient 24 units of Levemir and a sliding scale coverage and 

discontinue the insulin drip


Give the patient to additional doses of sodium bicarbonate 50 mEq each


Monitor the blood sugars and cover the patient with a sliding scale every 4 

hours


We'll hold off using the insulin pump for now


Monitor the white cell count


Monitor electrolytes


We will follow

## 2023-08-24 VITALS — HEART RATE: 88 BPM | DIASTOLIC BLOOD PRESSURE: 75 MMHG | RESPIRATION RATE: 16 BRPM | SYSTOLIC BLOOD PRESSURE: 121 MMHG

## 2023-08-24 VITALS — TEMPERATURE: 97.4 F

## 2023-08-24 LAB
GLUCOSE BLD-MCNC: 134 MG/DL (ref 70–110)
GLUCOSE BLD-MCNC: 156 MG/DL (ref 70–110)
GLUCOSE BLD-MCNC: 99 MG/DL (ref 70–110)

## 2023-08-24 RX ADMIN — HEPARIN SODIUM SCH: 5000 INJECTION, SOLUTION INTRAVENOUS; SUBCUTANEOUS at 00:04

## 2023-08-24 RX ADMIN — INSULIN LISPRO SCH UNIT: 100 INJECTION, SOLUTION INTRAVENOUS; SUBCUTANEOUS at 08:11

## 2023-08-24 RX ADMIN — ATORVASTATIN CALCIUM SCH MG: 10 TABLET, FILM COATED ORAL at 08:00

## 2023-08-24 RX ADMIN — CALCIUM CARBONATE (ANTACID) CHEW TAB 500 MG PRN MG: 500 CHEW TAB at 03:18

## 2023-08-24 RX ADMIN — HEPARIN SODIUM SCH: 5000 INJECTION, SOLUTION INTRAVENOUS; SUBCUTANEOUS at 07:52

## 2023-08-24 NOTE — P.PN
Subjective


Progress Note Date: 08/24/23








I am seeing this patient in new consultation today 08/22/2023 in the intensive 

care unit for diabetic ketoacidosis.  Patient is a 38-year-old female with past 

medical history significant for type 1 diabetes mellitus, hypertension, 

hyperlipidemia, and current every day smoker.  Patient [place] 

[place], and drinking alcohol on Sunday. Her insulin pump broke or was lost. She 

has not had any insulin since Sunday.  She presented to the emergency room last 

night with complaints of nausea and vomiting and hyperglycemia. Blood glucose on

arrival was 676, serum bicarb less than 5, anion gap unmeasurable, and she was 

ketone positive.  VBG has a pH of 7, pCO2 of 18. Patient is fully awake and or

iented, on room air, in no acute distress. Denies any infectious symptoms. She 

is refusing some aspects of care.  Blood pressure is normotensive.  Heart rhythm

is sinus tachycardia. There are kussmaul respirations.  Patient was started on 

the DKA protocol.  Insulin is currently infusing per protocol.  Normal saline is

also infusing at 200 ML's per hour.  She is also hyperkalemic with potassium of 

7.4, and there are hyperacute T waves on bedside monitor.  BMP has a sodium 140,

potassium 7.4, chloride 100, serum bicarb less than 5, BUN 32, creatinine 1.49, 

glucose 676.  There is a component of acute kidney injury, probably related to 

dehydration.  CBC on arrival showed some leukocytosis with WBC count of 27, 

hemoglobin 14.6, hematocrit 46.5, platelets 308.  Afebrile.  Urinalysis not 

concerning for UTI.  Patient will be monitored in the intensive care unit until 

her DKA resolved.





08/23/2023, I'm seeing the patient for a follow-up.  She is fully alert and 

awake and she is hungry and she is requesting food.  Overnight, she was On an 

insulin drip as the patient continued to have issues with some mild anion gap 

and non-anion gap metabolic acidosis.  This morning, her serum bicarb is at 16 

with a gap of 10 and a sodium level is at 135 with a potassium level of 4.6.  

The white cell count is 26 with a hemoglobin of 11.7.  She did have some 

reactive leukocytosis which is improving.  No nausea.  No emesis.  No other 

significant events overnight.  This morning, she is on insulin at 1 unit an 

hour.  She is also on D5 half-normal saline at the rate of 150 mL an hour.





08/24/2023, the patient is awake and alert and communicating.  She is back on 

insulin pump and she declined to go on long-acting insulin.Pump is functional.  

Most recent blood sugar is 99 and she is tolerating her diet and she is awake 

and alert.  She also declined having labs today.  Note that her white cell count

was improving.  Her serum bicarb was 16 yet Anion gap was only at 10.  For the 

most part, the patient is doing well.  Her other medications include Cymbalta 60

mg by mouth daily and Lipitor.





Objective





- Vital Signs


Vital signs: 


                                   Vital Signs











Temp  97.4 F L  08/24/23 08:00


 


Pulse  94   08/24/23 08:00


 


Resp  13   08/24/23 08:00


 


BP  124/80   08/24/23 08:00


 


Pulse Ox  99   08/24/23 08:00


 


FiO2      








                                 Intake & Output











 08/23/23 08/24/23 08/24/23





 18:59 06:59 18:59


 


Intake Total 2250.875 725 250


 


Output Total 0 600 300


 


Balance 2250.875 125 -50


 


Weight  61.5 kg 


 


Intake:   


 


  IV 1150 225 10


 


    D5-0.45% NaCl with KCl 1150 225 





    20Meq/l 1,000 ml @ 75 mls   





    /hr IV .[id number] [facility] Rx#:   





    [id number]   


 


    Invasive Line 1   10


 


  Intake, IV Titration 26.875  





  Amount   


 


    Insulin Regular 100 unit 26.875  





    In Sodium Chloride 0.9%   





    100 ml @ 0.1 UNITS/KG/HR   





    5.956 mls/hr IV .[id number]   





    [facility] Rx#:[id number]   


 


  Oral 1074 500 240


 


Output:   


 


  Urine 0 600 300


 


Other:   


 


  Voiding Method Bedside Commode  Toilet


 


  # Voids 1 1 














- Exam





The patient appeared well nourished and normally developed. Vital signs as do

cumented. Head exam is unremarkable. No scleral icterus or corneal arcus noted. 

Neck is without jugular venous distension, thyromegaly, or carotid bruits. 

Carotid upstrokes are brisk bilaterally. Lungs are clear to auscultation and 

percussion. Cardiac exam reveals the PMI to be normally sized and situated. 

Rhythm is regular. First and second heart sounds normal. No murmurs, rubs or 

gallops. Abdominal exam reveals normal bowel sounds, no masses, no organomegaly 

and no aortic enlargement. Extremities are nonedematous and both femoral and 

pedal pulses are normal.Examination of the skin revealed no evidence of 

significant rashes, suspicious appearing nevi or other concerning 

lesions.Neurologically, the patient is awake and alert and the patient does not 

have any focal neurological deficit.  Cranial nerves are essentially intact.





- Labs


CBC & Chem 7: 


                                 08/23/23 05:25





                                 08/23/23 05:25


Labs: 


                  Abnormal Lab Results - Last 24 Hours (Table)











  08/23/23 08/23/23 08/23/23 Range/Units





  05:25 09:08 10:13 


 


POC Glucose (mg/dL)   252 H  263 H  ()  mg/dL


 


Hemoglobin A1c  10.7 H    (<=6.0)  %














  08/23/23 08/23/23 08/23/23 Range/Units





  11:04 12:00 14:53 


 


POC Glucose (mg/dL)  251 H  200 H  130 H  ()  mg/dL


 


Hemoglobin A1c     (<=6.0)  %














  08/23/23 08/24/23 08/24/23 Range/Units





  20:06 00:08 04:11 


 


POC Glucose (mg/dL)  240 H  156 H  134 H  ()  mg/dL


 


Hemoglobin A1c     (<=6.0)  %














Assessment and Plan


Assessment: 





Acute diabetic ketoacidosis, , recovered and the patient is back on insulin 

pump, she is fully alert and awake and blood sugars under good control for now





Severe anion gap metabolic acidosis, secondary to above, improved and the 

patient has a component of non-anion gap metabolic acidosis at this point in 

time, she was given bicarb yesterday and follow-up electrolytes were not done as

the patient declined blood work





Severe hyperkalemia, with ECG changes , recovered 





Dehydration, recovered





Leukocytosis, doubt infectious etiology, reactive, improving





Acute kidney injury, likely prerenal secondary to dehydration, recovered





Hyperphosphatemia





Essential hypertension





Hyperlipidemia





Chronic nicotine dependence

















Plan:





Keep insulin pump


Monitor blood sugar


Off her diet


Continue Cymbalta and Lipitor


Home today

## 2023-08-24 NOTE — P.DS
Providers


Date of admission: 


08/21/23 22:30





Expected date of discharge: 08/24/23


Attending physician: 


Carmencita Haque MD





Consults: 





                                        





08/21/23 22:30


Consult Physician Routine 


   Consulting Provider: Oscar Dent


   Consult Reason/Comments: icu


   Do you want consulting provider notified?: Yes











Primary care physician: 


Stated None





Hospital Course: 





Assessment:





Diabetic ketoacidosis


Hypertension


Hyperlipidemia





Hospital course:





38-year-old female with insulin-dependent diabetes, hypertension, hyperlipidemia

presented for evaluation of nausea, vomiting.  In the emergency room, patient 

was afebrile, 113/55, heart rate 118, 100% on room air.  CBC demonstrated 

leukocytosis at 34.3.  Basic metabolic panel showed an anion gap of 19, CO2 of 

9, BUNs 32, creatinine 1.21, glucose of 358.  Potassium was initially 7.4, but 

improved to 4.3.  Phosphorus was 2.2.  Liver function tests are unremarkable.  

EKG demonstrated sinus tachycardia with peaked T waves.  Case is discussed with 

emergency room provider incision was made with the patient's intensive care unit

for DKA.  Pt was treated with IVF and insulin gtt.  Her gap closed, and patient 

reported improvement of nausea and vomiting.  She was adamantly against using SQ

insulin outside of her insulin pump - and had family member bring her insulin 

pump from home.  This insulin pump was an older pump that was not the same as 

the broken one that led to her hospitalization.  She reported that she will work

on getting a new replacement pump.  She was able to tolerate some toast and 

fruit on day of discharge.  She will f/u with PCP.








I spent 34 minutes coordinating this discharge on 8/24








Gen: awake, alert


HEENT: normocephalic, atraumatic, good hearing acuity, moist mucous membranes


Resp: good air exchange, breathing comfortably with no accessory muscle use


CVS: good distal perfusion x 4,


GI: soft, NTTP, ND


: no SPT, no CVAT, toledo catheter not present


MSK: no pitting edema, no clubbing


Neuro: non-focal, moving all extremities


Psych: cooperative, euthymic mood

















Patient Condition at Discharge: Good





Plan - Discharge Summary


New Discharge Prescriptions: 


New


   Calcium Carbonate [Tums] 500 mg PO QID PRN  tab


     PRN Reason: Heartburn


   Ondansetron Odt [Zofran Odt] 4 mg PO Q8HR PRN #30 tab


     PRN Reason: Nausea And Vomiting





Continue


   INSULIN LISPRO (For Pump) [humaLOG (For Pump)] 0.01 units SQ-PUMP CONTINUOUS


   DULoxetine HCL [Cymbalta] 60 mg PO DAILY


   Atorvastatin [Lipitor] 10 mg PO DAILY


   Glucagon [Baqsimi] 1 spray NASAL ONCE PRN


     PRN Reason: LOW BLOOD SUGAR





Discontinued


   Enalapril Maleate [Vasotec] 2.5 mg PO DAILY


Discharge Medication List





Atorvastatin [Lipitor] 10 mg PO DAILY 08/21/23 [History]


DULoxetine HCL [Cymbalta] 60 mg PO DAILY 08/21/23 [History]


Glucagon [Baqsimi] 1 spray NASAL ONCE PRN 08/21/23 [History]


INSULIN LISPRO (For Pump) [humaLOG (For Pump)] 0.01 units SQ-PUMP CONTINUOUS 

08/21/23 [History]


Calcium Carbonate [Tums] 500 mg PO QID PRN  tab 08/24/23 [Rx]


Ondansetron Odt [Zofran Odt] 4 mg PO Q8HR PRN #30 tab 08/24/23 [Rx]








Follow up Appointment(s)/Referral(s): 


None,Stated [Primary Care Provider] - 1-2 days


Patient Instructions/Handouts:  Diabetic Ketoacidosis (DC), Hypoglycemia in a 

Person with Diabetes (ED), Hyperkalemia (ED), Tachycardia (ED), What to Do if 

Your Blood Sugar is Low (ED)


Discharge Disposition: HOME SELF-CARE